# Patient Record
Sex: MALE | Race: BLACK OR AFRICAN AMERICAN | NOT HISPANIC OR LATINO | URBAN - METROPOLITAN AREA
[De-identification: names, ages, dates, MRNs, and addresses within clinical notes are randomized per-mention and may not be internally consistent; named-entity substitution may affect disease eponyms.]

---

## 2021-08-03 ENCOUNTER — INPATIENT (INPATIENT)
Facility: HOSPITAL | Age: 71
LOS: 6 days | Discharge: NURSING, REHAB OR LONG-TERM CARE | End: 2021-08-10
Attending: GENERAL ACUTE CARE HOSPITAL | Admitting: GENERAL ACUTE CARE HOSPITAL
Payer: MEDICAID

## 2021-08-03 VITALS
OXYGEN SATURATION: 97 % | HEIGHT: 67 IN | WEIGHT: 145.06 LBS | SYSTOLIC BLOOD PRESSURE: 115 MMHG | HEART RATE: 73 BPM | TEMPERATURE: 99 F | RESPIRATION RATE: 18 BRPM | DIASTOLIC BLOOD PRESSURE: 70 MMHG

## 2021-08-03 LAB
ALBUMIN SERPL ELPH-MCNC: 3.1 G/DL — LOW (ref 3.3–5)
ALP SERPL-CCNC: 97 U/L — SIGNIFICANT CHANGE UP (ref 40–120)
ALT FLD-CCNC: 28 U/L — SIGNIFICANT CHANGE UP (ref 12–78)
ANION GAP SERPL CALC-SCNC: 3 MMOL/L — LOW (ref 5–17)
APPEARANCE UR: CLEAR — SIGNIFICANT CHANGE UP
AST SERPL-CCNC: 173 U/L — HIGH (ref 15–37)
BACTERIA # UR AUTO: ABNORMAL
BASOPHILS # BLD AUTO: 0.02 K/UL — SIGNIFICANT CHANGE UP (ref 0–0.2)
BASOPHILS NFR BLD AUTO: 0.2 % — SIGNIFICANT CHANGE UP (ref 0–2)
BILIRUB SERPL-MCNC: 1.1 MG/DL — SIGNIFICANT CHANGE UP (ref 0.2–1.2)
BILIRUB UR-MCNC: NEGATIVE — SIGNIFICANT CHANGE UP
BUN SERPL-MCNC: 14 MG/DL — SIGNIFICANT CHANGE UP (ref 7–23)
CALCIUM SERPL-MCNC: 8.8 MG/DL — SIGNIFICANT CHANGE UP (ref 8.5–10.1)
CHLORIDE SERPL-SCNC: 109 MMOL/L — HIGH (ref 96–108)
CO2 SERPL-SCNC: 30 MMOL/L — SIGNIFICANT CHANGE UP (ref 22–31)
COLOR SPEC: YELLOW — SIGNIFICANT CHANGE UP
CREAT SERPL-MCNC: 0.87 MG/DL — SIGNIFICANT CHANGE UP (ref 0.5–1.3)
DIFF PNL FLD: ABNORMAL
EOSINOPHIL # BLD AUTO: 0.16 K/UL — SIGNIFICANT CHANGE UP (ref 0–0.5)
EOSINOPHIL NFR BLD AUTO: 1.8 % — SIGNIFICANT CHANGE UP (ref 0–6)
EPI CELLS # UR: SIGNIFICANT CHANGE UP
GLUCOSE BLDC GLUCOMTR-MCNC: 148 MG/DL — HIGH (ref 70–99)
GLUCOSE SERPL-MCNC: 104 MG/DL — HIGH (ref 70–99)
GLUCOSE UR QL: NEGATIVE MG/DL — SIGNIFICANT CHANGE UP
HCT VFR BLD CALC: 36.2 % — LOW (ref 39–50)
HGB BLD-MCNC: 12.5 G/DL — LOW (ref 13–17)
IMM GRANULOCYTES NFR BLD AUTO: 0.2 % — SIGNIFICANT CHANGE UP (ref 0–1.5)
KETONES UR-MCNC: ABNORMAL
LEUKOCYTE ESTERASE UR-ACNC: ABNORMAL
LYMPHOCYTES # BLD AUTO: 2.22 K/UL — SIGNIFICANT CHANGE UP (ref 1–3.3)
LYMPHOCYTES # BLD AUTO: 25.5 % — SIGNIFICANT CHANGE UP (ref 13–44)
MCHC RBC-ENTMCNC: 32.1 PG — SIGNIFICANT CHANGE UP (ref 27–34)
MCHC RBC-ENTMCNC: 34.5 GM/DL — SIGNIFICANT CHANGE UP (ref 32–36)
MCV RBC AUTO: 93.1 FL — SIGNIFICANT CHANGE UP (ref 80–100)
MONOCYTES # BLD AUTO: 0.76 K/UL — SIGNIFICANT CHANGE UP (ref 0–0.9)
MONOCYTES NFR BLD AUTO: 8.7 % — SIGNIFICANT CHANGE UP (ref 2–14)
NEUTROPHILS # BLD AUTO: 5.54 K/UL — SIGNIFICANT CHANGE UP (ref 1.8–7.4)
NEUTROPHILS NFR BLD AUTO: 63.6 % — SIGNIFICANT CHANGE UP (ref 43–77)
NITRITE UR-MCNC: NEGATIVE — SIGNIFICANT CHANGE UP
NRBC # BLD: 0 /100 WBCS — SIGNIFICANT CHANGE UP (ref 0–0)
PH UR: 6.5 — SIGNIFICANT CHANGE UP (ref 5–8)
PLATELET # BLD AUTO: 203 K/UL — SIGNIFICANT CHANGE UP (ref 150–400)
POTASSIUM SERPL-MCNC: 3.9 MMOL/L — SIGNIFICANT CHANGE UP (ref 3.5–5.3)
POTASSIUM SERPL-SCNC: 3.9 MMOL/L — SIGNIFICANT CHANGE UP (ref 3.5–5.3)
PROT SERPL-MCNC: 6.5 GM/DL — SIGNIFICANT CHANGE UP (ref 6–8.3)
PROT UR-MCNC: 30 MG/DL
RAPID RVP RESULT: SIGNIFICANT CHANGE UP
RBC # BLD: 3.89 M/UL — LOW (ref 4.2–5.8)
RBC # FLD: 12.9 % — SIGNIFICANT CHANGE UP (ref 10.3–14.5)
RBC CASTS # UR COMP ASSIST: ABNORMAL /HPF (ref 0–4)
SARS-COV-2 RNA SPEC QL NAA+PROBE: SIGNIFICANT CHANGE UP
SODIUM SERPL-SCNC: 142 MMOL/L — SIGNIFICANT CHANGE UP (ref 135–145)
SP GR SPEC: 1.01 — SIGNIFICANT CHANGE UP (ref 1.01–1.02)
UROBILINOGEN FLD QL: 4 MG/DL
WBC # BLD: 8.72 K/UL — SIGNIFICANT CHANGE UP (ref 3.8–10.5)
WBC # FLD AUTO: 8.72 K/UL — SIGNIFICANT CHANGE UP (ref 3.8–10.5)
WBC UR QL: SIGNIFICANT CHANGE UP

## 2021-08-03 PROCEDURE — 70486 CT MAXILLOFACIAL W/O DYE: CPT | Mod: 26,MA

## 2021-08-03 PROCEDURE — 70450 CT HEAD/BRAIN W/O DYE: CPT | Mod: 26

## 2021-08-03 PROCEDURE — 71045 X-RAY EXAM CHEST 1 VIEW: CPT | Mod: 26

## 2021-08-03 PROCEDURE — 72125 CT NECK SPINE W/O DYE: CPT | Mod: 26

## 2021-08-03 PROCEDURE — 72131 CT LUMBAR SPINE W/O DYE: CPT | Mod: 26,MA

## 2021-08-03 PROCEDURE — 99285 EMERGENCY DEPT VISIT HI MDM: CPT

## 2021-08-03 PROCEDURE — 99223 1ST HOSP IP/OBS HIGH 75: CPT

## 2021-08-03 RX ORDER — OXYCODONE AND ACETAMINOPHEN 5; 325 MG/1; MG/1
1 TABLET ORAL EVERY 4 HOURS
Refills: 0 | Status: DISCONTINUED | OUTPATIENT
Start: 2021-08-03 | End: 2021-08-06

## 2021-08-03 RX ORDER — SENNA PLUS 8.6 MG/1
2 TABLET ORAL AT BEDTIME
Refills: 0 | Status: DISCONTINUED | OUTPATIENT
Start: 2021-08-03 | End: 2021-08-10

## 2021-08-03 RX ORDER — ACETAMINOPHEN 500 MG
650 TABLET ORAL EVERY 6 HOURS
Refills: 0 | Status: DISCONTINUED | OUTPATIENT
Start: 2021-08-03 | End: 2021-08-10

## 2021-08-03 RX ORDER — AMLODIPINE BESYLATE 2.5 MG/1
5 TABLET ORAL DAILY
Refills: 0 | Status: DISCONTINUED | OUTPATIENT
Start: 2021-08-03 | End: 2021-08-04

## 2021-08-03 RX ORDER — POLYETHYLENE GLYCOL 3350 17 G/17G
17 POWDER, FOR SOLUTION ORAL
Refills: 0 | Status: DISCONTINUED | OUTPATIENT
Start: 2021-08-03 | End: 2021-08-10

## 2021-08-03 RX ORDER — HEPARIN SODIUM 5000 [USP'U]/ML
5000 INJECTION INTRAVENOUS; SUBCUTANEOUS EVERY 12 HOURS
Refills: 0 | Status: DISCONTINUED | OUTPATIENT
Start: 2021-08-03 | End: 2021-08-10

## 2021-08-03 RX ORDER — CARBIDOPA AND LEVODOPA 25; 100 MG/1; MG/1
1 TABLET ORAL THREE TIMES A DAY
Refills: 0 | Status: DISCONTINUED | OUTPATIENT
Start: 2021-08-03 | End: 2021-08-10

## 2021-08-03 RX ORDER — SIMVASTATIN 20 MG/1
10 TABLET, FILM COATED ORAL AT BEDTIME
Refills: 0 | Status: DISCONTINUED | OUTPATIENT
Start: 2021-08-03 | End: 2021-08-05

## 2021-08-03 RX ORDER — LIDOCAINE 4 G/100G
1 CREAM TOPICAL DAILY
Refills: 0 | Status: DISCONTINUED | OUTPATIENT
Start: 2021-08-03 | End: 2021-08-10

## 2021-08-03 RX ADMIN — SENNA PLUS 2 TABLET(S): 8.6 TABLET ORAL at 22:49

## 2021-08-03 RX ADMIN — SIMVASTATIN 10 MILLIGRAM(S): 20 TABLET, FILM COATED ORAL at 22:49

## 2021-08-03 RX ADMIN — AMLODIPINE BESYLATE 5 MILLIGRAM(S): 2.5 TABLET ORAL at 20:27

## 2021-08-03 RX ADMIN — HEPARIN SODIUM 5000 UNIT(S): 5000 INJECTION INTRAVENOUS; SUBCUTANEOUS at 20:27

## 2021-08-03 RX ADMIN — CARBIDOPA AND LEVODOPA 1 TABLET(S): 25; 100 TABLET ORAL at 22:49

## 2021-08-03 RX ADMIN — CARBIDOPA AND LEVODOPA 1 TABLET(S): 25; 100 TABLET ORAL at 20:27

## 2021-08-03 NOTE — H&P ADULT - NSHPPHYSICALEXAM_GEN_ALL_CORE
PHYSICAL EXAMINATION:  Vital Signs Last 24 Hrs  T(C): 36.7 (03 Aug 2021 15:49), Max: 37.1 (03 Aug 2021 11:33)  T(F): 98 (03 Aug 2021 15:49), Max: 98.8 (03 Aug 2021 11:33)  HR: 89 (03 Aug 2021 15:49) (73 - 89)  BP: 160/87 (03 Aug 2021 15:49) (115/70 - 160/87)  BP(mean): --  RR: 20 (03 Aug 2021 15:49) (18 - 20)  SpO2: 98% (03 Aug 2021 15:49) (97% - 98%)  CAPILLARY BLOOD GLUCOSE      POCT Blood Glucose.: 148 mg/dL (03 Aug 2021 11:43)      GENERAL: NAD, well-groomed, well-developed  HEAD:  atraumatic, normocephalic  EYES: sclera anicteric  ENMT: mucous membranes moist  NECK: supple, No JVD  CHEST/LUNG: clear to auscultation bilaterally; no rales, rhonchi, or wheezing b/l  HEART: normal S1, S2  ABDOMEN: BS+, soft, ND, NT   EXTREMITIES:  pulses palpable; no clubbing, cyanosis, or edema b/l LEs  NEURO: awake, alert, interactive; moves all extremities  SKIN: no rashes or lesions PHYSICAL EXAMINATION:  Vital Signs Last 24 Hrs  T(C): 36.7 (03 Aug 2021 15:49), Max: 37.1 (03 Aug 2021 11:33)  T(F): 98 (03 Aug 2021 15:49), Max: 98.8 (03 Aug 2021 11:33)  HR: 89 (03 Aug 2021 15:49) (73 - 89)  BP: 160/87 (03 Aug 2021 15:49) (115/70 - 160/87)  BP(mean): --  RR: 20 (03 Aug 2021 15:49) (18 - 20)  SpO2: 98% (03 Aug 2021 15:49) (97% - 98%)  CAPILLARY BLOOD GLUCOSE      POCT Blood Glucose.: 148 mg/dL (03 Aug 2021 11:43)      GENERAL: NAD, seen in ER, comfortable, mild LBP, no fevers.   HEAD:  atraumatic, normocephalic  EYES: sclera anicteric  ENMT: mucous membranes moist  NECK: supple, No JVD  CHEST/LUNG: clear to auscultation bilaterally; no rales, rhonchi, or wheezing b/l  HEART: normal S1, S2  ABDOMEN: BS+, soft, ND, NT   EXTREMITIES:  pulses palpable; no clubbing, cyanosis, or edema b/l LEs  NEURO: awake, alert, interactive; moves all extremities  SKIN: no rashes or lesions

## 2021-08-03 NOTE — H&P ADULT - ASSESSMENT
69 yo male PMH of Parkinson's syndrome, HLD, and takes aspirin daily presents to the ED for fall at home. Pt states he was reading at home, tried to get up, he stepped off and fell. No head trauma. No headache, neck pain, or chest pain. Pt has left eye swelling. Pt states has muscle back pain and it feels like a stick, jabbing pain. Pt is fully vaccinated for COVID.     Plan: Fall, PMH of Parkinson's disease. PT eval. PRN pain meds. CT LS spine and neck no fractures. CT head no acute injuries.     Will continue Sinemet tid, add Norvasc 5 mg/day for BP control and Zocur 10 mg/day for HLD.     Likely needs inpatient rehab.

## 2021-08-03 NOTE — H&P ADULT - HISTORY OF PRESENT ILLNESS
69 yo male PMH of Parkinson's syndrome, HLD, and takes aspirin daily presents to the ED for fall at home. Pt states he was reading at home, tried to get up, he stepped off and fell. No head trauma. No headache, neck pain, or chest pain. Pt has left eye swelling. Pt states has muscle back pain and it feels like a stick, jabbing pain. Pt is fully vaccinated for COVID.

## 2021-08-03 NOTE — ED PROVIDER NOTE - EYES, MLM
Clear bilaterally, pupils equal, round and reactive to light. Clear bilaterally, pupils equal, round and reactive to light +left eye swelling

## 2021-08-03 NOTE — ED PROVIDER NOTE - OBJECTIVE STATEMENT
Pt is a 69 yo male w/PMH of Parkinson's syndrome, HLD, and takes aspirin presents to the ED for fall. Pt states he was reading at home, tried to get up, he stepped off and fell. No head trauma. No headache, neck pain, or chest pain. PMD is Dr. Boyd. Pt has left eye swelling. Pt states has muscle back pain and it feels like a stick, jabbing pain. Pt is fully vaccinated for COVID. Pt is a 69 yo male w/PMH of Parkinson's syndrome and HLD,  presents to the ED for fall. Pt states he was reading at home, tried to get up, stepped off and fell. No head trauma. No headache, neck pain, or chest pain. Pt has left eye swelling, states he has muscle back pain and it feels like a stick, jabbing pain. Pt is fully vaccinated for COVID.

## 2021-08-03 NOTE — ED ADULT NURSE NOTE - OBJECTIVE STATEMENT
Pt c/o he fell several times in and out of the home, unable to do his ADLs, wife has to assist , MSK is weaker , unable to ambulate with cane anymore, pt also admits he is no longer to sit up in chair . pt does not recall his medications and doses . pt with left eye ecchymosis with small healing abrasion to the left brow.

## 2021-08-03 NOTE — ED PROVIDER NOTE - SCRIBE NAME
PT CALLED IN REQUESTING A REFILL FOR PAzelastine-Fluticasone 137-50 MCG/ACT suspension      PT CB NUMBER: [phone]  [facility]  FAX# [phone]   Lakshmi Harrington

## 2021-08-03 NOTE — ED PROVIDER NOTE - ENMT, MLM
Airway patent, Nasal mucosa clear. Mouth with normal mucosa. Throat has no vesicles, no oropharyngeal exudates and uvula is midline. Airway patent, Nasal mucosa clear. Mouth with normal mucosa. Throat has no vesicles, no oropharyngeal exudates and uvula is midline +left eye swelling

## 2021-08-03 NOTE — H&P ADULT - NSHPLABSRESULTS_GEN_ALL_CORE
LABS:                        12.5   8.72  )-----------( 203      ( 03 Aug 2021 13:59 )             36.2     08-03    142  |  109<H>  |  14  ----------------------------<  104<H>  3.9   |  30  |  0.87    Ca    8.8      03 Aug 2021 13:59    TPro  6.5  /  Alb  3.1<L>  /  TBili  1.1  /  DBili  x   /  AST  173<H>  /  ALT  28  /  AlkPhos  97  08-03      Urinalysis Basic - ( 03 Aug 2021 16:11 )    Color: Yellow / Appearance: Clear / S.015 / pH: x  Gluc: x / Ketone: Trace  / Bili: Negative / Urobili: 4 mg/dL   Blood: x / Protein: 30 mg/dL / Nitrite: Negative   Leuk Esterase: Trace / RBC: 6-10 /HPF / WBC 3-5   Sq Epi: x / Non Sq Epi: Occasional / Bacteria: Few          RADIOLOGY & ADDITIONAL TESTS:

## 2021-08-04 LAB
CULTURE RESULTS: SIGNIFICANT CHANGE UP
SPECIMEN SOURCE: SIGNIFICANT CHANGE UP

## 2021-08-04 RX ORDER — AMLODIPINE BESYLATE 2.5 MG/1
10 TABLET ORAL DAILY
Refills: 0 | Status: DISCONTINUED | OUTPATIENT
Start: 2021-08-04 | End: 2021-08-09

## 2021-08-04 RX ADMIN — AMLODIPINE BESYLATE 5 MILLIGRAM(S): 2.5 TABLET ORAL at 05:53

## 2021-08-04 RX ADMIN — POLYETHYLENE GLYCOL 3350 17 GRAM(S): 17 POWDER, FOR SOLUTION ORAL at 05:54

## 2021-08-04 RX ADMIN — HEPARIN SODIUM 5000 UNIT(S): 5000 INJECTION INTRAVENOUS; SUBCUTANEOUS at 05:54

## 2021-08-04 RX ADMIN — POLYETHYLENE GLYCOL 3350 17 GRAM(S): 17 POWDER, FOR SOLUTION ORAL at 17:35

## 2021-08-04 RX ADMIN — CARBIDOPA AND LEVODOPA 1 TABLET(S): 25; 100 TABLET ORAL at 13:17

## 2021-08-04 RX ADMIN — HEPARIN SODIUM 5000 UNIT(S): 5000 INJECTION INTRAVENOUS; SUBCUTANEOUS at 17:35

## 2021-08-04 RX ADMIN — SIMVASTATIN 10 MILLIGRAM(S): 20 TABLET, FILM COATED ORAL at 22:10

## 2021-08-04 RX ADMIN — CARBIDOPA AND LEVODOPA 1 TABLET(S): 25; 100 TABLET ORAL at 05:54

## 2021-08-04 RX ADMIN — LIDOCAINE 1 PATCH: 4 CREAM TOPICAL at 18:52

## 2021-08-04 RX ADMIN — CARBIDOPA AND LEVODOPA 1 TABLET(S): 25; 100 TABLET ORAL at 22:10

## 2021-08-04 RX ADMIN — LIDOCAINE 1 PATCH: 4 CREAM TOPICAL at 12:27

## 2021-08-04 RX ADMIN — SENNA PLUS 2 TABLET(S): 8.6 TABLET ORAL at 22:10

## 2021-08-04 NOTE — PROGRESS NOTE ADULT - SUBJECTIVE AND OBJECTIVE BOX
INTERVAL HPI/OVERNIGHT EVENTS:  Pt seen and examined at bedside.     Allergies/Intolerance: No Known Allergies      MEDICATIONS  (STANDING):  amLODIPine   Tablet 5 milliGRAM(s) Oral daily  carbidopa/levodopa  25/100 1 Tablet(s) Oral three times a day  heparin   Injectable 5000 Unit(s) SubCutaneous every 12 hours  lidocaine   4% Patch 1 Patch Transdermal daily  polyethylene glycol 3350 17 Gram(s) Oral two times a day  senna 2 Tablet(s) Oral at bedtime  simvastatin 10 milliGRAM(s) Oral at bedtime    MEDICATIONS  (PRN):  acetaminophen   Tablet .. 650 milliGRAM(s) Oral every 6 hours PRN Temp greater or equal to 38.5C (101.3F), Mild Pain (1 - 3)  oxycodone    5 mG/acetaminophen 325 mG 1 Tablet(s) Oral every 4 hours PRN Mild Pain (1 - 3)        ROS: all systems reviewed and wnl      PHYSICAL EXAMINATION:  Vital Signs Last 24 Hrs  T(C): 36.1 (04 Aug 2021 11:24), Max: 36.8 (03 Aug 2021 21:33)  T(F): 97 (04 Aug 2021 11:24), Max: 98.3 (03 Aug 2021 21:33)  HR: 103 (04 Aug 2021 11:24) (79 - 103)  BP: 151/84 (04 Aug 2021 11:24) (134/81 - 177/100)  BP(mean): 126 (03 Aug 2021 19:54) (126 - 126)  RR: 18 (04 Aug 2021 11:24) (16 - 20)  SpO2: 98% (04 Aug 2021 11:24) (97% - 98%)  CAPILLARY BLOOD GLUCOSE            GENERAL:   NECK: supple, No JVD  CHEST/LUNG: clear to auscultation bilaterally; no rales, rhonchi, or wheezing b/l  HEART: normal S1, S2  ABDOMEN: BS+, soft, ND, NT   EXTREMITIES:  pulses palpable; no clubbing, cyanosis, or edema b/l LEs  SKIN: no rashes or lesions      LABS:                        12.5   8.72  )-----------( 203      ( 03 Aug 2021 13:59 )             36.2     08-03    142  |  109<H>  |  14  ----------------------------<  104<H>  3.9   |  30  |  0.87    Ca    8.8      03 Aug 2021 13:59    TPro  6.5  /  Alb  3.1<L>  /  TBili  1.1  /  DBili  x   /  AST  173<H>  /  ALT  28  /  AlkPhos  97  08-03      Urinalysis Basic - ( 03 Aug 2021 16:11 )    Color: Yellow / Appearance: Clear / S.015 / pH: x  Gluc: x / Ketone: Trace  / Bili: Negative / Urobili: 4 mg/dL   Blood: x / Protein: 30 mg/dL / Nitrite: Negative   Leuk Esterase: Trace / RBC: 6-10 /HPF / WBC 3-5   Sq Epi: x / Non Sq Epi: Occasional / Bacteria: Few             INTERVAL HPI/OVERNIGHT EVENTS:  Pt seen and examined at bedside.     Allergies/Intolerance: No Known Allergies      MEDICATIONS  (STANDING):  amLODIPine   Tablet 5 milliGRAM(s) Oral daily  carbidopa/levodopa  25/100 1 Tablet(s) Oral three times a day  heparin   Injectable 5000 Unit(s) SubCutaneous every 12 hours  lidocaine   4% Patch 1 Patch Transdermal daily  polyethylene glycol 3350 17 Gram(s) Oral two times a day  senna 2 Tablet(s) Oral at bedtime  simvastatin 10 milliGRAM(s) Oral at bedtime    MEDICATIONS  (PRN):  acetaminophen   Tablet .. 650 milliGRAM(s) Oral every 6 hours PRN Temp greater or equal to 38.5C (101.3F), Mild Pain (1 - 3)  oxycodone    5 mG/acetaminophen 325 mG 1 Tablet(s) Oral every 4 hours PRN Mild Pain (1 - 3)        ROS: all systems reviewed and wnl      PHYSICAL EXAMINATION:  Vital Signs Last 24 Hrs  T(C): 36.1 (04 Aug 2021 11:24), Max: 36.8 (03 Aug 2021 21:33)  T(F): 97 (04 Aug 2021 11:24), Max: 98.3 (03 Aug 2021 21:33)  HR: 103 (04 Aug 2021 11:24) (79 - 103)  BP: 151/84 (04 Aug 2021 11:24) (134/81 - 177/100)  BP(mean): 126 (03 Aug 2021 19:54) (126 - 126)  RR: 18 (04 Aug 2021 11:24) (16 - 20)  SpO2: 98% (04 Aug 2021 11:24) (97% - 98%)  CAPILLARY BLOOD GLUCOSE            GENERAL: stable, comfortable on RA, daughter at bedside.   NECK: supple, No JVD  CHEST/LUNG: clear to auscultation bilaterally; no rales, rhonchi, or wheezing b/l  HEART: normal S1, S2  ABDOMEN: BS+, soft, ND, NT   EXTREMITIES:  pulses palpable; no clubbing, cyanosis, or edema b/l LEs  SKIN: no rashes or lesions      LABS:                        12.5   8.72  )-----------( 203      ( 03 Aug 2021 13:59 )             36.2     08-03    142  |  109<H>  |  14  ----------------------------<  104<H>  3.9   |  30  |  0.87    Ca    8.8      03 Aug 2021 13:59    TPro  6.5  /  Alb  3.1<L>  /  TBili  1.1  /  DBili  x   /  AST  173<H>  /  ALT  28  /  AlkPhos  97  08-03      Urinalysis Basic - ( 03 Aug 2021 16:11 )    Color: Yellow / Appearance: Clear / S.015 / pH: x  Gluc: x / Ketone: Trace  / Bili: Negative / Urobili: 4 mg/dL   Blood: x / Protein: 30 mg/dL / Nitrite: Negative   Leuk Esterase: Trace / RBC: 6-10 /HPF / WBC 3-5   Sq Epi: x / Non Sq Epi: Occasional / Bacteria: Few

## 2021-08-04 NOTE — PROGRESS NOTE ADULT - ASSESSMENT
71 yo male PMH of Parkinson's syndrome, HLD, and takes aspirin daily presents to the ED for fall at home. Pt states he was reading at home, tried to get up, he stepped off and fell. No head trauma. No headache, neck pain, or chest pain. Pt has left eye swelling. Pt states has muscle back pain and it feels like a stick, jabbing pain. Pt is fully vaccinated for COVID.     Plan: Fall, PMH of Parkinson's disease. PT eval. PRN pain meds. CT LS spine and neck no fractures. CT head no acute injuries.     Will continue Sinemet tid, add Norvasc 5 mg/day for BP control and Zocur 10 mg/day for HLD.     Likely needs inpatient rehab.  71 yo male PMH of Parkinson's syndrome, HLD, and takes aspirin daily presents to the ED for fall at home. Pt states he was reading at home, tried to get up, he stepped off and fell. No head trauma. No headache, neck pain, or chest pain. Pt has left eye swelling. Pt states has muscle back pain and it feels like a stick, jabbing pain. Pt is fully vaccinated for COVID.     Plan: Fall, PMH of Parkinson's disease. PT eval. PRN pain meds. CT LS spine and neck no fractures. CT head no acute injuries.     Will continue Sinemet tid, increase Norvasc 10 mg/day for BP control and Zocur 10 mg/day for HLD.     Likely needs inpatient rehab Thursday.

## 2021-08-05 PROCEDURE — 99233 SBSQ HOSP IP/OBS HIGH 50: CPT

## 2021-08-05 RX ADMIN — Medication 1 MILLIGRAM(S): at 14:50

## 2021-08-05 RX ADMIN — SENNA PLUS 2 TABLET(S): 8.6 TABLET ORAL at 21:51

## 2021-08-05 RX ADMIN — HEPARIN SODIUM 5000 UNIT(S): 5000 INJECTION INTRAVENOUS; SUBCUTANEOUS at 17:02

## 2021-08-05 RX ADMIN — CARBIDOPA AND LEVODOPA 1 TABLET(S): 25; 100 TABLET ORAL at 21:51

## 2021-08-05 RX ADMIN — LIDOCAINE 1 PATCH: 4 CREAM TOPICAL at 18:00

## 2021-08-05 RX ADMIN — LIDOCAINE 1 PATCH: 4 CREAM TOPICAL at 13:03

## 2021-08-05 RX ADMIN — POLYETHYLENE GLYCOL 3350 17 GRAM(S): 17 POWDER, FOR SOLUTION ORAL at 17:02

## 2021-08-05 RX ADMIN — CARBIDOPA AND LEVODOPA 1 TABLET(S): 25; 100 TABLET ORAL at 13:36

## 2021-08-05 RX ADMIN — LIDOCAINE 1 PATCH: 4 CREAM TOPICAL at 04:04

## 2021-08-05 NOTE — PROGRESS NOTE ADULT - ASSESSMENT
71 yo male PMH of Parkinson's syndrome, HLD, and takes aspirin daily presents to the ED for fall at home. Pt states he was reading at home, tried to get up, he stepped off and fell. No head trauma. No headache, neck pain, or chest pain. Pt has left eye swelling. Pt states has muscle back pain and it feels like a stick, jabbing pain. Pt is fully vaccinated for COVID.     Plan: Fall, PMH of Parkinson's disease. PT eval. PRN pain meds. CT LS spine and neck no fractures. CT head no acute injuries.     Will continue Sinemet tid, my colleague increased Norvasc 10 mg/day for BP control , monitor bp  HLD with elevated lfts hold statin     dispo await PT eval case d/w sw/cm 69 yo male PMH of Parkinson's syndrome, HLD, and takes aspirin daily presents to the ED for fall at home. Pt states he was reading at home, tried to get up, he stepped off and fell. No head trauma. No headache, neck pain, or chest pain. Pt has left eye swelling. Pt states has muscle back pain and it feels like a stick, jabbing pain. Pt is fully vaccinated for COVID.     Plan: Fall, PMH of Parkinson's disease. PT eval. PRN pain meds. CT LS spine and neck no fractures. CT head no acute injuries.   but CT lumbar has spinal stenosis l5-s1. will get ortho evaluation   consider MRI spine but has fixation hardware from surgery 20 years ago in b/l maxillary sinuses    Will continue Sinemet tid, my colleague increased Norvasc 10 mg/day for BP control , monitor bp  HLD with elevated lfts hold statin     dispo await PT eval case d/w sw/cm

## 2021-08-05 NOTE — PHYSICAL THERAPY INITIAL EVALUATION ADULT - GAIT PATTERN USED, PT EVAL
assist needed with RW navigation/abruptly freezes/delays in motor processing and sequencing noted/swing-to gait

## 2021-08-05 NOTE — PHYSICAL THERAPY INITIAL EVALUATION ADULT - TRANSFER TRAINING, PT EVAL
Yes
Independent in  transfer ability bed to chair and vice versa using appropriate assistive device  and prevent falls.

## 2021-08-05 NOTE — PROGRESS NOTE ADULT - SUBJECTIVE AND OBJECTIVE BOX
INTERVAL HPI/OVERNIGHT EVENTS:  Pt seen and examined at bedside.     Allergies/Intolerance: No Known Allergies      MEDICATIONS  (STANDING):  amLODIPine   Tablet 10 milliGRAM(s) Oral daily  carbidopa/levodopa  25/100 1 Tablet(s) Oral three times a day  heparin   Injectable 5000 Unit(s) SubCutaneous every 12 hours  lidocaine   4% Patch 1 Patch Transdermal daily  polyethylene glycol 3350 17 Gram(s) Oral two times a day  senna 2 Tablet(s) Oral at bedtime  simvastatin 10 milliGRAM(s) Oral at bedtime    MEDICATIONS  (PRN):  acetaminophen   Tablet .. 650 milliGRAM(s) Oral every 6 hours PRN Temp greater or equal to 38.5C (101.3F), Mild Pain (1 - 3)  oxycodone    5 mG/acetaminophen 325 mG 1 Tablet(s) Oral every 4 hours PRN Mild Pain (1 - 3)        ROS: all systems reviewed and wnl    Vital Signs Last 24 Hrs  T(C): 36.3 (05 Aug 2021 05:32), Max: 36.8 (04 Aug 2021 23:35)  T(F): 97.3 (05 Aug 2021 05:32), Max: 98.3 (04 Aug 2021 23:35)  HR: 77 (05 Aug 2021 05:32) (65 - 77)  BP: 131/77 (05 Aug 2021 05:32) (92/59 - 131/77)  BP(mean): --  RR: 18 (05 Aug 2021 05:32) (18 - 18)  SpO2: 98% (05 Aug 2021 05:32) (98% - 99%)    GENERAL: stable, comfortable on RA, daughter at bedside.   NECK: supple, No JVD  CHEST/LUNG: clear to auscultation bilaterally; no rales, rhonchi, or wheezing b/l  HEART: normal S1, S2  ABDOMEN: BS+, soft, ND, NT   EXTREMITIES:  pulses palpable; no clubbing, cyanosis, or edema b/l LEs  SKIN: no rashes or lesions      LABS:                                   12.5   8.72  )-----------( 203      ( 03 Aug 2021 13:59 )             36.2   08-03    142  |  109<H>  |  14  ----------------------------<  104<H>  3.9   |  30  |  0.87    Ca    8.8      03 Aug 2021 13:59    TPro  6.5  /  Alb  3.1<L>  /  TBili  1.1  /  DBili  x   /  AST  173<H>  /  ALT  28  /  AlkPhos  97  08-03        Urinalysis Basic - ( 03 Aug 2021 16:11 )    Color: Yellow / Appearance: Clear / S.015 / pH: x  Gluc: x / Ketone: Trace  / Bili: Negative / Urobili: 4 mg/dL   Blood: x / Protein: 30 mg/dL / Nitrite: Negative   Leuk Esterase: Trace / RBC: 6-10 /HPF / WBC 3-5   Sq Epi: x / Non Sq Epi: Occasional / Bacteria: Few             INTERVAL HPI/OVERNIGHT EVENTS:  Pt seen and examined at bedside.     Allergies/Intolerance: No Known Allergies      MEDICATIONS  (STANDING):  amLODIPine   Tablet 10 milliGRAM(s) Oral daily  carbidopa/levodopa  25/100 1 Tablet(s) Oral three times a day  heparin   Injectable 5000 Unit(s) SubCutaneous every 12 hours  lidocaine   4% Patch 1 Patch Transdermal daily  polyethylene glycol 3350 17 Gram(s) Oral two times a day  senna 2 Tablet(s) Oral at bedtime  simvastatin 10 milliGRAM(s) Oral at bedtime    MEDICATIONS  (PRN):  acetaminophen   Tablet .. 650 milliGRAM(s) Oral every 6 hours PRN Temp greater or equal to 38.5C (101.3F), Mild Pain (1 - 3)  oxycodone    5 mG/acetaminophen 325 mG 1 Tablet(s) Oral every 4 hours PRN Mild Pain (1 - 3)        ROS: all systems reviewed and wnl    Vital Signs Last 24 Hrs  T(C): 36.3 (05 Aug 2021 05:32), Max: 36.8 (04 Aug 2021 23:35)  T(F): 97.3 (05 Aug 2021 05:32), Max: 98.3 (04 Aug 2021 23:35)  HR: 77 (05 Aug 2021 05:32) (65 - 77)  BP: 131/77 (05 Aug 2021 05:32) (92/59 - 131/77)  BP(mean): --  RR: 18 (05 Aug 2021 05:32) (18 - 18)  SpO2: 98% (05 Aug 2021 05:32) (98% - 99%)    GENERAL: stable, comfortable on RA, wife  at bedside.   NECK: supple, No JVD  CHEST/LUNG: clear to auscultation bilaterally; no rales, rhonchi, or wheezing b/l  HEART: normal S1, S2  ABDOMEN: BS+, soft, ND, NT   EXTREMITIES:  pulses palpable; no clubbing, cyanosis, or edema b/l LEs  SKIN: no rashes or lesions   MS: pain with palpation of right paraspinal along lower lumbar and midline , symmetric weakness      LABS:                                   12.5   8.72  )-----------( 203      ( 03 Aug 2021 13:59 )             36.2   08-03    142  |  109<H>  |  14  ----------------------------<  104<H>  3.9   |  30  |  0.87    Ca    8.8      03 Aug 2021 13:59    TPro  6.5  /  Alb  3.1<L>  /  TBili  1.1  /  DBili  x   /  AST  173<H>  /  ALT  28  /  AlkPhos  97  08-03        Urinalysis Basic - ( 03 Aug 2021 16:11 )    Color: Yellow / Appearance: Clear / S.015 / pH: x  Gluc: x / Ketone: Trace  / Bili: Negative / Urobili: 4 mg/dL   Blood: x / Protein: 30 mg/dL / Nitrite: Negative   Leuk Esterase: Trace / RBC: 6-10 /HPF / WBC 3-5   Sq Epi: x / Non Sq Epi: Occasional / Bacteria: Few      < from: CT Lumbar Spine No Cont (21 @ 18:26) >  IMPRESSION:    L5-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    MRI may provide helpful additional evaluation, if clinically indicated.      < end of copied text >  < from: CT Maxillofacial No Cont (21 @ 17:08) >  IMPRESSION:    CT HEAD:  No intracranial bleed or depressed skull fracture.      CT FACIAL BONES: No acute fracture or acute dislocation.    Chronic sinus disease/sinusitis.      CT CERVICAL SPINE: No acute fracture or acute subluxation.      < end of copied text >         INTERVAL HPI/OVERNIGHT EVENTS:  Pt seen and examined at bedside.     Allergies/Intolerance: No Known Allergies      MEDICATIONS  (STANDING):  amLODIPine   Tablet 10 milliGRAM(s) Oral daily  carbidopa/levodopa  25/100 1 Tablet(s) Oral three times a day  heparin   Injectable 5000 Unit(s) SubCutaneous every 12 hours  lidocaine   4% Patch 1 Patch Transdermal daily  polyethylene glycol 3350 17 Gram(s) Oral two times a day  senna 2 Tablet(s) Oral at bedtime  simvastatin 10 milliGRAM(s) Oral at bedtime    MEDICATIONS  (PRN):  acetaminophen   Tablet .. 650 milliGRAM(s) Oral every 6 hours PRN Temp greater or equal to 38.5C (101.3F), Mild Pain (1 - 3)  oxycodone    5 mG/acetaminophen 325 mG 1 Tablet(s) Oral every 4 hours PRN Mild Pain (1 - 3)        ROS: all systems reviewed and wnl    Vital Signs Last 24 Hrs  T(C): 36.3 (05 Aug 2021 05:32), Max: 36.8 (04 Aug 2021 23:35)  T(F): 97.3 (05 Aug 2021 05:32), Max: 98.3 (04 Aug 2021 23:35)  HR: 77 (05 Aug 2021 05:32) (65 - 77)  BP: 131/77 (05 Aug 2021 05:32) (92/59 - 131/77)  BP(mean): --  RR: 18 (05 Aug 2021 05:32) (18 - 18)  SpO2: 98% (05 Aug 2021 05:32) (98% - 99%)    GENERAL: stable, comfortable on RA, wife  at bedside.   NECK: supple, No JVD  CHEST/LUNG: clear to auscultation bilaterally; no rales, rhonchi, or wheezing b/l  HEART: normal S1, S2  ABDOMEN: BS+, soft, ND, NT   EXTREMITIES:  pulses palpable; no clubbing, cyanosis, or edema b/l LEs  SKIN: no rashes or lesions   MS: pain with palpation of right paraspinal along lower lumbar and midline , symmetric weakness   neuro alert and confused      LABS:                                   12.5   8.72  )-----------( 203      ( 03 Aug 2021 13:59 )             36.2   08-03    142  |  109<H>  |  14  ----------------------------<  104<H>  3.9   |  30  |  0.87    Ca    8.8      03 Aug 2021 13:59    TPro  6.5  /  Alb  3.1<L>  /  TBili  1.1  /  DBili  x   /  AST  173<H>  /  ALT  28  /  AlkPhos  97  08-03        Urinalysis Basic - ( 03 Aug 2021 16:11 )    Color: Yellow / Appearance: Clear / S.015 / pH: x  Gluc: x / Ketone: Trace  / Bili: Negative / Urobili: 4 mg/dL   Blood: x / Protein: 30 mg/dL / Nitrite: Negative   Leuk Esterase: Trace / RBC: 6-10 /HPF / WBC 3-5   Sq Epi: x / Non Sq Epi: Occasional / Bacteria: Few      < from: CT Lumbar Spine No Cont (21 @ 18:26) >  IMPRESSION:    L5-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    MRI may provide helpful additional evaluation, if clinically indicated.      < end of copied text >  < from: CT Maxillofacial No Cont (21 @ 17:08) >  IMPRESSION:    CT HEAD:  No intracranial bleed or depressed skull fracture.      CT FACIAL BONES: No acute fracture or acute dislocation.    Chronic sinus disease/sinusitis.      CT CERVICAL SPINE: No acute fracture or acute subluxation.      < end of copied text >

## 2021-08-05 NOTE — PHYSICAL THERAPY INITIAL EVALUATION ADULT - GAIT TRAINING, PT EVAL
Independent in ambulation with use of RW device up to 100 feet observing proper gait pattern, posture and use of walking device safely.

## 2021-08-05 NOTE — CONSULT NOTE ADULT - SUBJECTIVE AND OBJECTIVE BOX
70y M presenting with a fall. Ortho was consulted to rule out spine Fx. Patient history, review of systems, and physical exam was significantly limited due to patient receiving Ativan earlier in the day. Nurse stated that Patient was agitated in the afternoon, kicked her, and then tried to walk out of his unit. She states that when security was called to help bring him back to bed, patient appeared confused stated that he didn't sleep with men. The nurse states that patient was confuse at baseline. Patient was resting comfortably when examined at bedside. Patient could not answer question intelligibly. Patient could voice his name, but could not identify where he was, what his birthday was, or what his age was.    PHYSICAL EXAM  GEN: NAD, AOx1    SPINE:  Skin intact, no incisions noted  NTTP over the bony prominences of the cervical, thoracic, lumbar, sacral spine  - Paraspinal TTP of the cervical, thoracic, lumbar, sacral spine  No bony step-offs   Grossly moving all extremities  + Radial Pulse  + DP Pulses  Grimacing with passive extension of the lower extremities, but Patient unable to localize source.        MOTOR EXAM:                          Elbow Flex (C5)     Wrist Ext (C6)     Elbow Ext (C7)      Finger Flex (C8), Finger Abduction (T1) moved spontaneous but unable to follow commands  RIGHT                 5/5                         5/5                         5/5                                                          LEFT                    5/5                         5/5                         5/5                                                                                  Hip Flex (L2)      Knee Ext (L3)      Ank Dorsiflex (L4), Hallux Ext (L5), and Ank PlantarFlex (S1) moved spontaneous but unable to follow commands  RIGHT               5/5                      5/5                                                                                 LEFT                  5/5                      5/5                                                                                  SENSORY EXAM:                        C5      C6      C7      C8       T1          RIGHT          +         +        +         +         +          (+= patient voiced sensation present, unable to determine if impaired)  LEFT             +         +        +         +         +              Lower extremity:  Patient responding to painful stimuli bilaterally, more on the right side than the left. Unable to communicate sensation in LE.      Negative Ruth's sign bilaterally  Negative Babinski bilaterally   Negative Myoclonus bilaterally     70y M presenting with a fall. Ortho was consulted due to lower extremity weakness. Patient history, review of systems, and physical exam is significantly limited due to patient receiving Ativan earlier. Nurse stated that Patient was agitated in the afternoon, kicked her, and then tried to walk out of his unit. She states that when security was called to help bring him back to bed, patient appeared confused stated that he "does not sleep with men". The nurse states that patient is also confused at baseline. Patient was resting comfortably when examined at bedside. Patient could not answer questions intelligibly. Patient could voice his name, but could not identify where he is, what his birthday is, or what his age is.    PHYSICAL EXAM  GEN: NAD, AOx1    SPINE:  Skin intact, no incisions or lesions noted  No apparent TTP over the bony prominences of the cervical, thoracic, lumbar, sacral spine  No apparent Paraspinal TTP of the cervical, thoracic, lumbar, sacral spine  No bony step-offs   Grossly moving all extremities  + Radial Pulse  + DP Pulses  Grimacing with passive extension of the lower extremities, but patient unable to localize source.      MOTOR EXAM:                          Elbow Flex (C5)     Wrist Ext (C6)     Elbow Ext (C7)      Finger Flex (C8)    Finger Abduction (T1)  RIGHT                 x/5                         x/5                         x/5                          x/5                              x/5  LEFT                    x/5                         x/5                         x/5                          x/5                              x/5                           Hip Flex (L2)      Knee Ext (L3)      Ank Dorsiflex (L4)     Hallux Ext (L5)     Ank PlantarFlex (S1)  RIGHT               x/5                      x/5                          x/5                            x/5                           x/5  LEFT                  x/5                      x/5                          x/5                            x/5                           x/5      SENSORY EXAM:                        C5      C6      C7      C8       T1          RIGHT          x         x        x         x         x          (0=absent, 1=impaired, 2=normal, NT=not testable)  LEFT             x        x       x        x        x          (0=absent, 1=impaired, 2=normal, NT=not testable)                        L2        L3       L4      L5       S1          RIGHT        x          x          x        x        x           (0=absent, 1=impaired, 2=normal, NT=not testable)  LEFT           x          x         x       x        x           (0=absent, 1=impaired, 2=normal, NT=not testable)    Negative Ruth's sign bilaterally  Negative Babinski bilaterally   Negative Myoclonus bilaterally

## 2021-08-05 NOTE — PHYSICAL THERAPY INITIAL EVALUATION ADULT - ADDITIONAL COMMENTS
Pt lives with family in a house with 4 steps to enter B railing. Pt was independent with ambulation and used cane as needed. Assist required with ADLS. Family provides assist as needed.

## 2021-08-05 NOTE — CONSULT NOTE ADULT - ASSESSMENT
70y M presenting with rule out of spinal fx.    -As noted above, exam was limited to patient's somnolence   -Will reassess patient again in the AM when patient may be more alert  -Reviewed imaging and no concern for acute fractures requiring urgent surgical fixation  -If follow up exam is concerning, will order MRI to evaluate further  -Discussed with attending, they are aware and agree with the above plan 70y M presenting with lower extremity weakness    -As noted above, exam was limited to patient's somnolence   -Will reassess patient again in the AM when patient may be more alert  -Reviewed CT scan, +degenerative changes at L5-S1, no fractures or other bony pathology noted, no concern requiring urgent surgical fixation  -If follow up exam is concerning, will order MRI to evaluate further  -Discussed with attending, they are aware and agree with the above plan

## 2021-08-06 LAB
ANION GAP SERPL CALC-SCNC: 4 MMOL/L — LOW (ref 5–17)
BUN SERPL-MCNC: 11 MG/DL — SIGNIFICANT CHANGE UP (ref 7–23)
CALCIUM SERPL-MCNC: 8.7 MG/DL — SIGNIFICANT CHANGE UP (ref 8.5–10.1)
CHLORIDE SERPL-SCNC: 108 MMOL/L — SIGNIFICANT CHANGE UP (ref 96–108)
CO2 SERPL-SCNC: 30 MMOL/L — SIGNIFICANT CHANGE UP (ref 22–31)
COVID-19 SPIKE DOMAIN AB INTERP: POSITIVE
COVID-19 SPIKE DOMAIN ANTIBODY RESULT: 199 U/ML — HIGH
CREAT SERPL-MCNC: 0.77 MG/DL — SIGNIFICANT CHANGE UP (ref 0.5–1.3)
GLUCOSE SERPL-MCNC: 109 MG/DL — HIGH (ref 70–99)
HCT VFR BLD CALC: 38.7 % — LOW (ref 39–50)
HCV AB S/CO SERPL IA: 0.1 S/CO — SIGNIFICANT CHANGE UP (ref 0–0.99)
HCV AB SERPL-IMP: SIGNIFICANT CHANGE UP
HGB BLD-MCNC: 13.4 G/DL — SIGNIFICANT CHANGE UP (ref 13–17)
MAGNESIUM SERPL-MCNC: 2.5 MG/DL — SIGNIFICANT CHANGE UP (ref 1.6–2.6)
MCHC RBC-ENTMCNC: 32.1 PG — SIGNIFICANT CHANGE UP (ref 27–34)
MCHC RBC-ENTMCNC: 34.6 GM/DL — SIGNIFICANT CHANGE UP (ref 32–36)
MCV RBC AUTO: 92.8 FL — SIGNIFICANT CHANGE UP (ref 80–100)
NRBC # BLD: 0 /100 WBCS — SIGNIFICANT CHANGE UP (ref 0–0)
PHOSPHATE SERPL-MCNC: 3.3 MG/DL — SIGNIFICANT CHANGE UP (ref 2.5–4.5)
PLATELET # BLD AUTO: 212 K/UL — SIGNIFICANT CHANGE UP (ref 150–400)
POTASSIUM SERPL-MCNC: 3.7 MMOL/L — SIGNIFICANT CHANGE UP (ref 3.5–5.3)
POTASSIUM SERPL-SCNC: 3.7 MMOL/L — SIGNIFICANT CHANGE UP (ref 3.5–5.3)
RBC # BLD: 4.17 M/UL — LOW (ref 4.2–5.8)
RBC # FLD: 12.8 % — SIGNIFICANT CHANGE UP (ref 10.3–14.5)
SARS-COV-2 IGG+IGM SERPL QL IA: 199 U/ML — HIGH
SARS-COV-2 IGG+IGM SERPL QL IA: POSITIVE
SODIUM SERPL-SCNC: 142 MMOL/L — SIGNIFICANT CHANGE UP (ref 135–145)
WBC # BLD: 6.54 K/UL — SIGNIFICANT CHANGE UP (ref 3.8–10.5)
WBC # FLD AUTO: 6.54 K/UL — SIGNIFICANT CHANGE UP (ref 3.8–10.5)

## 2021-08-06 PROCEDURE — 99232 SBSQ HOSP IP/OBS MODERATE 35: CPT

## 2021-08-06 PROCEDURE — 90792 PSYCH DIAG EVAL W/MED SRVCS: CPT

## 2021-08-06 PROCEDURE — 72148 MRI LUMBAR SPINE W/O DYE: CPT | Mod: 26

## 2021-08-06 PROCEDURE — 93010 ELECTROCARDIOGRAM REPORT: CPT

## 2021-08-06 PROCEDURE — 72141 MRI NECK SPINE W/O DYE: CPT | Mod: 26

## 2021-08-06 PROCEDURE — 72146 MRI CHEST SPINE W/O DYE: CPT | Mod: 26

## 2021-08-06 RX ORDER — QUETIAPINE FUMARATE 200 MG/1
25 TABLET, FILM COATED ORAL AT BEDTIME
Refills: 0 | Status: DISCONTINUED | OUTPATIENT
Start: 2021-08-06 | End: 2021-08-10

## 2021-08-06 RX ORDER — HALOPERIDOL DECANOATE 100 MG/ML
2 INJECTION INTRAMUSCULAR EVERY 6 HOURS
Refills: 0 | Status: DISCONTINUED | OUTPATIENT
Start: 2021-08-06 | End: 2021-08-06

## 2021-08-06 RX ORDER — SODIUM CHLORIDE 9 MG/ML
1000 INJECTION, SOLUTION INTRAVENOUS
Refills: 0 | Status: DISCONTINUED | OUTPATIENT
Start: 2021-08-06 | End: 2021-08-10

## 2021-08-06 RX ADMIN — POLYETHYLENE GLYCOL 3350 17 GRAM(S): 17 POWDER, FOR SOLUTION ORAL at 05:14

## 2021-08-06 RX ADMIN — LIDOCAINE 1 PATCH: 4 CREAM TOPICAL at 18:38

## 2021-08-06 RX ADMIN — HEPARIN SODIUM 5000 UNIT(S): 5000 INJECTION INTRAVENOUS; SUBCUTANEOUS at 17:16

## 2021-08-06 RX ADMIN — POLYETHYLENE GLYCOL 3350 17 GRAM(S): 17 POWDER, FOR SOLUTION ORAL at 17:16

## 2021-08-06 RX ADMIN — HEPARIN SODIUM 5000 UNIT(S): 5000 INJECTION INTRAVENOUS; SUBCUTANEOUS at 05:14

## 2021-08-06 RX ADMIN — QUETIAPINE FUMARATE 25 MILLIGRAM(S): 200 TABLET, FILM COATED ORAL at 22:06

## 2021-08-06 RX ADMIN — OXYCODONE AND ACETAMINOPHEN 1 TABLET(S): 5; 325 TABLET ORAL at 05:16

## 2021-08-06 RX ADMIN — LIDOCAINE 1 PATCH: 4 CREAM TOPICAL at 03:53

## 2021-08-06 RX ADMIN — CARBIDOPA AND LEVODOPA 1 TABLET(S): 25; 100 TABLET ORAL at 05:15

## 2021-08-06 RX ADMIN — LIDOCAINE 1 PATCH: 4 CREAM TOPICAL at 11:42

## 2021-08-06 RX ADMIN — CARBIDOPA AND LEVODOPA 1 TABLET(S): 25; 100 TABLET ORAL at 13:46

## 2021-08-06 RX ADMIN — CARBIDOPA AND LEVODOPA 1 TABLET(S): 25; 100 TABLET ORAL at 22:06

## 2021-08-06 RX ADMIN — AMLODIPINE BESYLATE 10 MILLIGRAM(S): 2.5 TABLET ORAL at 05:15

## 2021-08-06 RX ADMIN — LIDOCAINE 1 PATCH: 4 CREAM TOPICAL at 23:48

## 2021-08-06 RX ADMIN — SENNA PLUS 2 TABLET(S): 8.6 TABLET ORAL at 22:05

## 2021-08-06 RX ADMIN — OXYCODONE AND ACETAMINOPHEN 1 TABLET(S): 5; 325 TABLET ORAL at 07:02

## 2021-08-06 NOTE — CHART NOTE - NSCHARTNOTEFT_GEN_A_CORE
A/P: 70y Male with low back pain and lower extremity weakness     -Pain control as needed  -WBAT/OOB with assistance as needed  -VTE ppx per primary team  -MRI of the C/T/L spine demonstrates degenerative changes. No significant findings that would require urgent surgical intervention at this time, including the patients physical exam findings.  -Medical management per primary team  -No acute surgical intervention at this time.  -Patient should FU with Dr Gottlieb upon discharge from the hospital, call the office to make an appointment  -Ortho to [facility]  -Discussed with attending who is aware and agrees with the plan

## 2021-08-06 NOTE — BH CONSULTATION LIAISON ASSESSMENT NOTE - RISK ASSESSMENT
Chronic risk factors: male gender, age > 65 yrs; neurodegenerative illness. Protective factors: medication and treatment compliant; no formal psychiatric history; no  suicide attempts; no self-injurious behavior; no hx of aggression/violence; no substance use; no legal issues; stable domicile; no access to guns; strong family support; access to health services. No acute risk factors identified

## 2021-08-06 NOTE — BH CONSULTATION LIAISON ASSESSMENT NOTE - SUMMARY
behavioral dysregulation, impulsivity and agitation can occur in Parkinson's Disease and also as a side effect from Sinemet:   - TREATMENT protocol:  1) antiparkinson medications should be reduced to the minimum therapeutic dose or discontinued in a sequential manner IF possible; 2) use of second-generation antipsychotics, such as clozapine, pimavanserin, or quetiapine can be tried  - avoid first-generation antipsychotics as they worsen movements  - Patient has no capacity to make his medical decisions    behavioral dysregulation, impulsivity and agitation can occur in Parkinson's Disease and also as a side effect from Sinemet. Reason for agitation clearly identifiable at this time (Patient's wife reported that yesterday Patient accused her of kicking him out of their home / abandoning him in the hospital, so when she left, he became upset and tried to go after her.   - TREATMENT protocol:  1) antiparkinson medications should be reduced to the minimum therapeutic dose or discontinued in a sequential manner IF possible; 2) use of second-generation antipsychotics, such as clozapine, pimavanserin, or quetiapine can be tried  - avoid first-generation antipsychotics as they worsen movements  - Patient has no capacity to make his medical decisions

## 2021-08-06 NOTE — PROGRESS NOTE ADULT - ASSESSMENT
A/P: 70y Male with low back pain and lower extremity weakness     -Pain control as needed  -WBAT/OOB with assistance as needed  -VTE ppx per primary team  -May recommend MRI for further imaging workup  -Medical management per primary team  -Will discuss with attending, and will advise if plan changes

## 2021-08-06 NOTE — BH CONSULTATION LIAISON ASSESSMENT NOTE - NSBHCHARTREVIEWLAB_PSY_A_CORE FT
08-06    142  |  108  |  11  ----------------------------<  109<H>  3.7   |  30  |  0.77    Ca    8.7      06 Aug 2021 11:01  Phos  3.3     08-06  Mg     2.5     08-06

## 2021-08-06 NOTE — BH CONSULTATION LIAISON ASSESSMENT NOTE - HPI (INCLUDE ILLNESS QUALITY, SEVERITY, DURATION, TIMING, CONTEXT, MODIFYING FACTORS, ASSOCIATED SIGNS AND SYMPTOMS)
69 yo  male, noncaregiver, domiciled with family, w/PMH of Parkinson's syndrome, HLD, admitted s/p fall. He was reading at home, tried to get up, he stepped off and fell. Hospital stay notable for agitation, confusion (ie. "Nurse stated that Patient was agitated in the afternoon, kicked her, and then tried to walk out of his unit. She states that when security was called to help bring him back to bed, patient appeared confused stated that he "does not sleep with men". The nurse states that patient is also confused at baseline.") CT scan, +degenerative changes at L5-S1, no fractures or other bony pathology noted, no concern requiring urgent surgical fixation as per Ortho consult. NO charted subsequent episodes of agitation.     ISTOP Reference #:[id number] no record found  CVM no record of patient  69 yo AAM, , noncaregiver, domiciled with wife in a private home, w/PMH of Parkinson's syndrome, HLD, admitted s/p fall. He was reading at home, tried to get up, he stepped off and fell. Hospital stay notable for agitation, confusion (ie. "Nurse stated that Patient was agitated in the afternoon, kicked her, and then tried to walk out of his unit. She states that when security was called to help bring him back to bed, patient appeared confused stated that he "does not sleep with men". The nurse states that patient is also confused at baseline.") CT scan, +degenerative changes at L5-S1, no fractures or other bony pathology noted, no concern requiring urgent surgical fixation as per Ortho consult. NO charted subsequent episodes of agitation.     EXAM: dozing off in bed. Awakens when nurse gives him his medication which he takes. Makes eye contact with Writer when his name is being called , looks somewhat confused, does not engage in meaningful conversation. No PMA noted.     COLLATERAL FROM WIFE AT BEDSIDE: no formal psych hx; no substance use hx; observed changes in mood, behavior after Sinemet. Wife reported that yesterady Patient accused her of kicking him out of their home / abandoning him in the hospital, so when she left, he became upset and tried to go after her.     ISTOP Reference #:[id number] no record found  CVM no record of patient

## 2021-08-06 NOTE — BH CONSULTATION LIAISON ASSESSMENT NOTE - NSBHCONSULTRECOMMENDOTHER_PSY_A_CORE FT
discontinue haldol as it is a first-generation antipsychotic that can further worsen Parkinson's symptoms  - try Ativan 2mg IVP PRN first before resorting to antipsychotic use/trial  discontinue haldol as it is a first-generation antipsychotic that can further worsen Parkinson's symptoms  - try Ativan 2mg IVP PRN first before resorting to antipsychotic use/trial   - psych cleared for discharge

## 2021-08-06 NOTE — BH CONSULTATION LIAISON ASSESSMENT NOTE - NSBHCHARTREVIEWINVESTIGATE_PSY_A_CORE FT
CT Lumbar Spine No Cont (08.03.21)  L5-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  MRI may provide helpful additional evaluation, if clinically indicated.  QTc 439

## 2021-08-06 NOTE — PROGRESS NOTE ADULT - SUBJECTIVE AND OBJECTIVE BOX
Patient seen and examined at bedside. Awake and alert this am. Oriented to person and place. Knows birthday but is not able to report appropriate age. No acute complaints at this time. Pain well controlled. Denies fevers/chills. Denies bowel/bladder changes. Denies saddle anesthesia. Denies numbness/tingling/paresthesias.       Vital Signs Last 24 Hrs  T(C): 36.4 (08-06-21 @ 05:22), Max: 36.8 (08-05-21 @ 17:26)  T(F): 97.6 (08-06-21 @ 05:22), Max: 98.2 (08-05-21 @ 17:26)  HR: 84 (08-06-21 @ 05:22) (68 - 106)  BP: 151/86 (08-06-21 @ 05:22) (118/64 - 151/86)  BP(mean): --  RR: 18 (08-06-21 @ 05:22) (18 - 20)  SpO2: 98% (08-06-21 @ 05:22) (96% - 98%)      PHYSICAL EXAM  GEN: NAD, AAOx3    SPINE:  Skin intact  No ttp to palpation throughout c/t/l/s spine  Grossly moving all extremities  + Radial Pulses  + DP Pulses  Compartments soft and compressible  No calf tenderness bilaterally      MOTOR EXAM:                          Elbow Flex (C5)     Wrist Ext (C6)     Elbow Ext (C7)      Finger Flex (C8)    Finger Abduction (T1)  RIGHT                 5/5                         5/5                         5/5                          5/5                              4/5  LEFT                    5/5                         5/5                         5/5                          5/5                              5/5                           Hip Flex (L2)      Knee Ext (L3)      Ank Dorsiflex (L4)     Hallux Ext (L5)     Ank PlantarFlex (S1)  RIGHT               5/5                      5/5                          4/5                            5/5                           5/5  LEFT                  5/5                      5/5                          5/5                            5/5                           5/5      SENSORY EXAM:                      C5      C6      C7      C8       T1          RIGHT          2         2        2         2         2          (0=absent, 1=impaired, 2=normal, NT=not testable)  LEFT             2         2        2         2         2          (0=absent, 1=impaired, 2=normal, NT=not testable)                        L2        L3       L4      L5       S1          RIGHT        2          2         2        2        2           (0=absent, 1=impaired, 2=normal, NT=not testable)  LEFT           2          2         2        2        2           (0=absent, 1=impaired, 2=normal, NT=not testable)    Negative Ruth's sign bilaterally  Negative Babinski bilaterally   Negative Myoclonus bilaterally           Patient seen and examined at bedside. Awake and alert this am. Oriented to person and place. Knows birthday but is not able to report appropriate age. No acute complaints at this time. Pain well controlled. Denies fevers/chills. Denies bowel/bladder changes. Denies saddle anesthesia. Denies numbness/tingling/paresthesias.       Vital Signs Last 24 Hrs  T(C): 36.4 (08-06-21 @ 05:22), Max: 36.8 (08-05-21 @ 17:26)  T(F): 97.6 (08-06-21 @ 05:22), Max: 98.2 (08-05-21 @ 17:26)  HR: 84 (08-06-21 @ 05:22) (68 - 106)  BP: 151/86 (08-06-21 @ 05:22) (118/64 - 151/86)  BP(mean): --  RR: 18 (08-06-21 @ 05:22) (18 - 20)  SpO2: 98% (08-06-21 @ 05:22) (96% - 98%)      PHYSICAL EXAM  GEN: NAD, AAOx3    SPINE:  Skin intact  No ttp to palpation throughout c/t/l/s spine  Grossly moving all extremities  + Radial Pulses  + DP Pulses  Compartments soft and compressible  No calf tenderness bilaterally      MOTOR EXAM:                          Elbow Flex (C5)     Wrist Ext (C6)     Elbow Ext (C7)      Finger Flex (C8)    Finger Abduction (T1)  RIGHT                 5/5                         5/5                         5/5                          5/5                              4/5  LEFT                    5/5                         5/5                         5/5                          5/5                              5/5                           Hip Flex (L2)      Knee Ext (L3)      Ank Dorsiflex (L4)     Hallux Ext (L5)     Ank PlantarFlex (S1)  RIGHT               5/5                      5/5                          4/5                            4/5                           5/5  LEFT                  5/5                      5/5                          5/5                            5/5                           5/5      SENSORY EXAM:                      C5      C6      C7      C8       T1          RIGHT          2         2        2         2         2          (0=absent, 1=impaired, 2=normal, NT=not testable)  LEFT             2         2        2         2         2          (0=absent, 1=impaired, 2=normal, NT=not testable)                        L2        L3       L4      L5       S1          RIGHT        2          2         2        2        2           (0=absent, 1=impaired, 2=normal, NT=not testable)  LEFT           2          2         2        2        2           (0=absent, 1=impaired, 2=normal, NT=not testable)    Negative Ruth's sign bilaterally  Negative Babinski bilaterally   Negative Myoclonus bilaterally           Patient seen and examined at bedside. Awake and alert this am. Oriented to person and place. Knows birthday but is not able to report appropriate age. No acute complaints at this time. Pain well controlled. Denies fevers/chills. Denies bowel/bladder changes. Denies saddle anesthesia. Denies numbness/tingling/paresthesias.       Vital Signs Last 24 Hrs  T(C): 36.4 (08-06-21 @ 05:22), Max: 36.8 (08-05-21 @ 17:26)  T(F): 97.6 (08-06-21 @ 05:22), Max: 98.2 (08-05-21 @ 17:26)  HR: 84 (08-06-21 @ 05:22) (68 - 106)  BP: 151/86 (08-06-21 @ 05:22) (118/64 - 151/86)  BP(mean): --  RR: 18 (08-06-21 @ 05:22) (18 - 20)  SpO2: 98% (08-06-21 @ 05:22) (96% - 98%)      PHYSICAL EXAM  GEN: NAD, AAOx3    SPINE:  Skin intact  No ttp to palpation throughout c/t/l/s spine  Grossly moving all extremities  + Radial Pulses  + DP Pulses  Compartments soft and compressible  No calf tenderness bilaterally      MOTOR EXAM:                          Elbow Flex (C5)     Wrist Ext (C6)     Elbow Ext (C7)      Finger Flex (C8)    Finger Abduction (T1)  RIGHT                 5/5                         5/5                         5/5                          5/5                              4/5  LEFT                    5/5                         5/5                         5/5                          5/5                              5/5                           Hip Flex (L2)      Knee Ext (L3)      Ank Dorsiflex (L4)     Hallux Ext (L5)     Ank PlantarFlex (S1)  RIGHT               5/5                      5/5                          4/5                            4/5                           5/5  LEFT                  5-/5                     5/5                          5/5                            5/5                           5/5      SENSORY EXAM:                      C5      C6      C7      C8       T1          RIGHT          2         2        2         2         2          (0=absent, 1=impaired, 2=normal, NT=not testable)  LEFT             2         2        2         2         2          (0=absent, 1=impaired, 2=normal, NT=not testable)                        L2        L3       L4      L5       S1          RIGHT        2          2         2        2        2           (0=absent, 1=impaired, 2=normal, NT=not testable)  LEFT           2          2         2        2        2           (0=absent, 1=impaired, 2=normal, NT=not testable)    Negative Ruth's sign bilaterally  Negative Babinski bilaterally   Negative Myoclonus bilaterally

## 2021-08-06 NOTE — PROGRESS NOTE ADULT - ASSESSMENT
71 yo male PMH of Parkinson's syndrome, HLD, and takes aspirin daily presents to the ED for fall at home.    CXR: clear   UA neg   RVP neg   EKG: NSR, QTc 439     Assessment and Plan:   s/p Fall    Pt with low back pain and lower extremity weakness   no e/o infection, afebrile, no leukocytosis    CT LS spine and neck no fractures. CT head no acute injuries. Chronic sinus disease/sinusitis.  Ortho following , follow MRI C/T/L spine   pain control prn , lidocaine patch     Parkinson's Dementia -- with agitated/ aggressive  behavior   Will continue Sinemet tid,   add haldol IVP prn (AVOID ATIVAN)   seroquel qhs   EKG reviewed , no QTc prolongation     HTN/HLD   continue with  Norvasc 10 mg/day for BP control , monitor bp  HLD with elevated lfts hold statin, repeat LFTs      Preventative measures   heparin SQ-dvt ppx  fall precautions   aspiration precautions     PT: ALEKSANDRA        71 yo male PMH of Parkinson's syndrome, HLD, and takes aspirin daily presents to the ED for fall at home.    CXR: clear   UA neg   RVP neg   EKG: NSR, QTc 439     Assessment and Plan:   s/p Fall    Pt with low back pain and lower extremity weakness   no e/o infection, afebrile, no leukocytosis    CT LS spine and neck no fractures. CT head no acute injuries. Chronic sinus disease/sinusitis.  Ortho following , follow MRI C/T/L spine   pain control prn , lidocaine patch     Parkinson's Dementia -- with agitated/ aggressive  behavior   Will continue Sinemet tid,   add haldol IVP prn (AVOID ATIVAN)   seroquel qhs   EKG reviewed , no QTc prolongation   Psych consulted     HTN/HLD   continue with  Norvasc 10 mg/day for BP control , monitor bp  HLD with elevated lfts hold statin, repeat LFTs      Preventative measures   heparin SQ-dvt ppx  fall precautions   aspiration precautions     PT: ALEKSANDRA

## 2021-08-06 NOTE — BH CONSULTATION LIAISON ASSESSMENT NOTE - OTHER
impaired at this time  constricted with low reactivity  unable to describe  limited  deferred at this time  adequate  did not engage verbally

## 2021-08-06 NOTE — PROGRESS NOTE ADULT - SUBJECTIVE AND OBJECTIVE BOX
69 yo male PMH of Parkinson's syndrome, HLD, and takes aspirin daily presents to the ED for fall at home. Pt states he was reading at home, tried to get up, he stepped off and fell. No head trauma. No headache, neck pain, or chest pain. Pt has left eye swelling. Pt states has muscle back pain and it feels like a stick, jabbing pain. Pt is fully vaccinated for COVID.       INTERVAL HPI/OVERNIGHT EVENTS:  Pt seen and examined at bedside.   no overnight events,.   combative and aggressive yesterday, requiring ativan IVP x 1     today more calm     Denies fever, chills, N/V, dizziness, HA, cough, CP, palpitations, SOB, abdominal pain, dysuria, diarrhea, constipation.     Allergies/Intolerance: No Known Allergies      MEDICATIONS  (STANDING):  amLODIPine   Tablet 10 milliGRAM(s) Oral daily  carbidopa/levodopa  25/100 1 Tablet(s) Oral three times a day  heparin   Injectable 5000 Unit(s) SubCutaneous every 12 hours  lidocaine   4% Patch 1 Patch Transdermal daily  polyethylene glycol 3350 17 Gram(s) Oral two times a day  senna 2 Tablet(s) Oral at bedtime  simvastatin 10 milliGRAM(s) Oral at bedtime    MEDICATIONS  (PRN):  acetaminophen   Tablet .. 650 milliGRAM(s) Oral every 6 hours PRN Temp greater or equal to 38.5C (101.3F), Mild Pain (1 - 3)  oxycodone    5 mG/acetaminophen 325 mG 1 Tablet(s) Oral every 4 hours PRN Mild Pain (1 - 3)        Vital Signs Last 24 Hrs  T(C): 36.6 (06 Aug 2021 11:23), Max: 36.8 (05 Aug 2021 17:26)  T(F): 97.8 (06 Aug 2021 11:23), Max: 98.2 (05 Aug 2021 17:26)  HR: 100 (06 Aug 2021 11:23) (70 - 106)  BP: 123/78 (06 Aug 2021 11:23) (118/64 - 151/86)  BP(mean): --  RR: 18 (06 Aug 2021 11:23) (18 - 18)  SpO2: 97% (06 Aug 2021 11:23) (96% - 98%)    GENERAL: comfortable on RA,  NAD  NECK: supple, No JVD  CHEST/LUNG: clear to auscultation bilaterally; no rales, rhonchi, or wheezing b/l  HEART: normal S1, S2  ABDOMEN: BS+, soft, ND, NT   EXTREMITIES:  pulses palpable b/l; no clubbing, cyanosis, calf tenderness or edema b/l LEs  SKIN: no rashes or lesions   MS: pain with palpation of right paraspinal along lower lumbar and midline , symmetric weakness   neuro alert and confused, moving all extremities       LABS:                                              13.4   6.54  )-----------( 212      ( 06 Aug 2021 11:01 )             38.7   08-06    142  |  108  |  11  ----------------------------<  109<H>  3.7   |  30  |  0.77    Ca    8.7      06 Aug 2021 11:01  Phos  3.3     08-06  Mg     2.5     08-06            Urinalysis Basic - ( 03 Aug 2021 16:11 )    Color: Yellow / Appearance: Clear / S.015 / pH: x  Gluc: x / Ketone: Trace  / Bili: Negative / Urobili: 4 mg/dL   Blood: x / Protein: 30 mg/dL / Nitrite: Negative   Leuk Esterase: Trace / RBC: 6-10 /HPF / WBC 3-5   Sq Epi: x / Non Sq Epi: Occasional / Bacteria: Few      < from: CT Lumbar Spine No Cont (21 @ 18:26) >  IMPRESSION:    L5-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    MRI may provide helpful additional evaluation, if clinically indicated.      < end of copied text >  < from: CT Maxillofacial No Cont (21 @ 17:08) >  IMPRESSION:    CT HEAD:  No intracranial bleed or depressed skull fracture.      CT FACIAL BONES: No acute fracture or acute dislocation.    Chronic sinus disease/sinusitis.      CT CERVICAL SPINE: No acute fracture or acute subluxation.      < end of copied text >

## 2021-08-06 NOTE — BH CONSULTATION LIAISON ASSESSMENT NOTE - NSBHCHARTREVIEWVS_PSY_A_CORE FT
Vital Signs Last 24 Hrs  T(C): 36.6 (06 Aug 2021 11:23), Max: 36.8 (05 Aug 2021 17:26)  T(F): 97.8 (06 Aug 2021 11:23), Max: 98.2 (05 Aug 2021 17:26)  HR: 100 (06 Aug 2021 11:23) (70 - 106)  BP: 123/78 (06 Aug 2021 11:23) (118/64 - 151/86)  BP(mean): --  RR: 18 (06 Aug 2021 11:23) (18 - 18)  SpO2: 97% (06 Aug 2021 11:23) (96% - 98%)

## 2021-08-06 NOTE — BH CONSULTATION LIAISON ASSESSMENT NOTE - NSSUICPROTFACT_PSY_ALL_CORE
Responsibility to children, family, or others/Identifies reasons for living/Supportive social network of family or friends/Fear of death or the actual act of killing self/Positive therapeutic relationships/Sikhism beliefs

## 2021-08-06 NOTE — BH CONSULTATION LIAISON ASSESSMENT NOTE - CURRENT MEDICATION
MEDICATIONS  (STANDING):  amLODIPine   Tablet 10 milliGRAM(s) Oral daily  carbidopa/levodopa  25/100 1 Tablet(s) Oral three times a day  heparin   Injectable 5000 Unit(s) SubCutaneous every 12 hours  lactated ringers. 1000 milliLiter(s) (85 mL/Hr) IV Continuous <Continuous>  lidocaine   4% Patch 1 Patch Transdermal daily  polyethylene glycol 3350 17 Gram(s) Oral two times a day  QUEtiapine 25 milliGRAM(s) Oral at bedtime  senna 2 Tablet(s) Oral at bedtime    MEDICATIONS  (PRN):  acetaminophen   Tablet .. 650 milliGRAM(s) Oral every 6 hours PRN Temp greater or equal to 38.5C (101.3F), Mild Pain (1 - 3)  haloperidol    Injectable 2 milliGRAM(s) IV Push every 6 hours PRN severe agitation/combative  oxycodone    5 mG/acetaminophen 325 mG 1 Tablet(s) Oral every 4 hours PRN Mild Pain (1 - 3)

## 2021-08-07 LAB — GLUCOSE BLDC GLUCOMTR-MCNC: 106 MG/DL — HIGH (ref 70–99)

## 2021-08-07 PROCEDURE — 99232 SBSQ HOSP IP/OBS MODERATE 35: CPT

## 2021-08-07 RX ADMIN — LIDOCAINE 1 PATCH: 4 CREAM TOPICAL at 11:09

## 2021-08-07 RX ADMIN — LIDOCAINE 1 PATCH: 4 CREAM TOPICAL at 21:56

## 2021-08-07 RX ADMIN — POLYETHYLENE GLYCOL 3350 17 GRAM(S): 17 POWDER, FOR SOLUTION ORAL at 05:52

## 2021-08-07 RX ADMIN — HEPARIN SODIUM 5000 UNIT(S): 5000 INJECTION INTRAVENOUS; SUBCUTANEOUS at 17:11

## 2021-08-07 RX ADMIN — QUETIAPINE FUMARATE 25 MILLIGRAM(S): 200 TABLET, FILM COATED ORAL at 21:35

## 2021-08-07 RX ADMIN — AMLODIPINE BESYLATE 10 MILLIGRAM(S): 2.5 TABLET ORAL at 05:54

## 2021-08-07 RX ADMIN — CARBIDOPA AND LEVODOPA 1 TABLET(S): 25; 100 TABLET ORAL at 14:51

## 2021-08-07 RX ADMIN — CARBIDOPA AND LEVODOPA 1 TABLET(S): 25; 100 TABLET ORAL at 21:35

## 2021-08-07 RX ADMIN — HEPARIN SODIUM 5000 UNIT(S): 5000 INJECTION INTRAVENOUS; SUBCUTANEOUS at 05:52

## 2021-08-07 RX ADMIN — SENNA PLUS 2 TABLET(S): 8.6 TABLET ORAL at 21:35

## 2021-08-07 RX ADMIN — SODIUM CHLORIDE 85 MILLILITER(S): 9 INJECTION, SOLUTION INTRAVENOUS at 05:44

## 2021-08-07 RX ADMIN — CARBIDOPA AND LEVODOPA 1 TABLET(S): 25; 100 TABLET ORAL at 05:52

## 2021-08-07 NOTE — PROGRESS NOTE ADULT - ASSESSMENT
71 yo male PMH of Parkinson's syndrome, HLD, and takes aspirin daily presents to the ED for fall at home.    CXR: clear   UA neg   RVP neg   EKG: NSR, QTc 439     Assessment and Plan:   s/p Fall    Pt with low back pain and lower extremity weakness   no e/o infection, afebrile, no leukocytosis    CT LS spine and neck no fractures. CT head no acute injuries. Chronic sinus disease/sinusitis.  Ortho following , follow MRI C/T/L spine   pain control prn , lidocaine patch     Parkinson's Dementia -- with agitated/ aggressive  behavior   Will continue Sinemet tid,   seroquel qhs   ativan prn per psych recs   EKG reviewed , no QTc prolongation   Psych consulted appreciated     HTN/HLD   continue with  Norvasc 10 mg/day for BP control , monitor bp  HLD with elevated lfts hold statin, repeat LFTs      Preventative measures   heparin SQ-dvt ppx  fall precautions   aspiration precautions     PT: ALEKSANDRA , awaiting auth

## 2021-08-07 NOTE — PROGRESS NOTE ADULT - SUBJECTIVE AND OBJECTIVE BOX
69 yo male PMH of Parkinson's syndrome, HLD, and takes aspirin daily presents to the ED for fall at home. Pt states he was reading at home, tried to get up, he stepped off and fell. No head trauma. No headache, neck pain, or chest pain. Pt has left eye swelling. Pt states has muscle back pain and it feels like a stick, jabbing pain. Pt is fully vaccinated for COVID.       INTERVAL HPI/OVERNIGHT EVENTS:  Pt seen and examined at bedside.   no overnight events,.   calm and cooperative today     Denies fever, chills, N/V, dizziness, HA, cough, CP, palpitations, SOB, abdominal pain, dysuria, diarrhea, constipation.     Allergies/Intolerance: No Known Allergies      MEDICATIONS  (STANDING):  amLODIPine   Tablet 10 milliGRAM(s) Oral daily  carbidopa/levodopa  25/100 1 Tablet(s) Oral three times a day  heparin   Injectable 5000 Unit(s) SubCutaneous every 12 hours  lidocaine   4% Patch 1 Patch Transdermal daily  polyethylene glycol 3350 17 Gram(s) Oral two times a day  senna 2 Tablet(s) Oral at bedtime  simvastatin 10 milliGRAM(s) Oral at bedtime    MEDICATIONS  (PRN):  acetaminophen   Tablet .. 650 milliGRAM(s) Oral every 6 hours PRN Temp greater or equal to 38.5C (101.3F), Mild Pain (1 - 3)  oxycodone    5 mG/acetaminophen 325 mG 1 Tablet(s) Oral every 4 hours PRN Mild Pain (1 - 3)        Vital Signs Last 24 Hrs  T(C): 36.6 (07 Aug 2021 16:16), Max: 36.6 (07 Aug 2021 00:14)  T(F): 97.9 (07 Aug 2021 16:16), Max: 97.9 (07 Aug 2021 16:16)  HR: 76 (07 Aug 2021 16:16) (76 - 100)  BP: 119/71 (07 Aug 2021 16:16) (109/69 - 157/66)  BP(mean): --  RR: 17 (07 Aug 2021 16:16) (17 - 19)  SpO2: 99% (07 Aug 2021 16:16) (97% - 99%)    GENERAL: comfortable on RA,  NAD  NECK: supple, No JVD  CHEST/LUNG: clear to auscultation bilaterally; no rales, rhonchi, or wheezing b/l  HEART: normal S1, S2  ABDOMEN: BS+, soft, ND, NT   EXTREMITIES:  pulses palpable b/l; no clubbing, cyanosis, calf tenderness or edema b/l LEs  SKIN: no rashes or lesions   MS: pain with palpation of right paraspinal along lower lumbar and midline , symmetric weakness   neuro alert and confused, moving all extremities       LABS:                                              13.4   6.54  )-----------( 212      ( 06 Aug 2021 11:01 )             38.7   08-06    142  |  108  |  11  ----------------------------<  109<H>  3.7   |  30  |  0.77    Ca    8.7      06 Aug 2021 11:01  Phos  3.3     08-  Mg     2.5     08-06                Urinalysis Basic - ( 03 Aug 2021 16:11 )    Color: Yellow / Appearance: Clear / S.015 / pH: x  Gluc: x / Ketone: Trace  / Bili: Negative / Urobili: 4 mg/dL   Blood: x / Protein: 30 mg/dL / Nitrite: Negative   Leuk Esterase: Trace / RBC: 6-10 /HPF / WBC 3-5   Sq Epi: x / Non Sq Epi: Occasional / Bacteria: Few      < from: CT Lumbar Spine No Cont (21 @ 18:26) >  IMPRESSION:    L5-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    MRI may provide helpful additional evaluation, if clinically indicated.      < end of copied text >  < from: CT Maxillofacial No Cont (21 @ 17:08) >  IMPRESSION:    CT HEAD:  No intracranial bleed or depressed skull fracture.      CT FACIAL BONES: No acute fracture or acute dislocation.    Chronic sinus disease/sinusitis.      CT CERVICAL SPINE: No acute fracture or acute subluxation.      < end of copied text >    Care discussed in detail with patient and daughter at bedside.  All questions and concerns addressed

## 2021-08-08 LAB
ALBUMIN SERPL ELPH-MCNC: 2.6 G/DL — LOW (ref 3.3–5)
ALP SERPL-CCNC: 98 U/L — SIGNIFICANT CHANGE UP (ref 40–120)
ALT FLD-CCNC: 12 U/L — SIGNIFICANT CHANGE UP (ref 12–78)
ANION GAP SERPL CALC-SCNC: 7 MMOL/L — SIGNIFICANT CHANGE UP (ref 5–17)
AST SERPL-CCNC: 29 U/L — SIGNIFICANT CHANGE UP (ref 15–37)
BILIRUB DIRECT SERPL-MCNC: 0.23 MG/DL — HIGH (ref 0.05–0.2)
BILIRUB INDIRECT FLD-MCNC: 0.9 MG/DL — SIGNIFICANT CHANGE UP (ref 0.2–1)
BILIRUB SERPL-MCNC: 1.1 MG/DL — SIGNIFICANT CHANGE UP (ref 0.2–1.2)
BUN SERPL-MCNC: 12 MG/DL — SIGNIFICANT CHANGE UP (ref 7–23)
CALCIUM SERPL-MCNC: 8.7 MG/DL — SIGNIFICANT CHANGE UP (ref 8.5–10.1)
CHLORIDE SERPL-SCNC: 106 MMOL/L — SIGNIFICANT CHANGE UP (ref 96–108)
CO2 SERPL-SCNC: 28 MMOL/L — SIGNIFICANT CHANGE UP (ref 22–31)
CREAT SERPL-MCNC: 0.92 MG/DL — SIGNIFICANT CHANGE UP (ref 0.5–1.3)
GLUCOSE SERPL-MCNC: 92 MG/DL — SIGNIFICANT CHANGE UP (ref 70–99)
HCT VFR BLD CALC: 38.9 % — LOW (ref 39–50)
HGB BLD-MCNC: 13.1 G/DL — SIGNIFICANT CHANGE UP (ref 13–17)
MAGNESIUM SERPL-MCNC: 2.3 MG/DL — SIGNIFICANT CHANGE UP (ref 1.6–2.6)
MCHC RBC-ENTMCNC: 32.3 PG — SIGNIFICANT CHANGE UP (ref 27–34)
MCHC RBC-ENTMCNC: 33.7 GM/DL — SIGNIFICANT CHANGE UP (ref 32–36)
MCV RBC AUTO: 95.8 FL — SIGNIFICANT CHANGE UP (ref 80–100)
NRBC # BLD: 0 /100 WBCS — SIGNIFICANT CHANGE UP (ref 0–0)
PHOSPHATE SERPL-MCNC: 3.5 MG/DL — SIGNIFICANT CHANGE UP (ref 2.5–4.5)
PLATELET # BLD AUTO: 229 K/UL — SIGNIFICANT CHANGE UP (ref 150–400)
POTASSIUM SERPL-MCNC: 3.8 MMOL/L — SIGNIFICANT CHANGE UP (ref 3.5–5.3)
POTASSIUM SERPL-SCNC: 3.8 MMOL/L — SIGNIFICANT CHANGE UP (ref 3.5–5.3)
PROT SERPL-MCNC: 6.3 GM/DL — SIGNIFICANT CHANGE UP (ref 6–8.3)
RBC # BLD: 4.06 M/UL — LOW (ref 4.2–5.8)
RBC # FLD: 12.9 % — SIGNIFICANT CHANGE UP (ref 10.3–14.5)
SODIUM SERPL-SCNC: 141 MMOL/L — SIGNIFICANT CHANGE UP (ref 135–145)
WBC # BLD: 6.65 K/UL — SIGNIFICANT CHANGE UP (ref 3.8–10.5)
WBC # FLD AUTO: 6.65 K/UL — SIGNIFICANT CHANGE UP (ref 3.8–10.5)

## 2021-08-08 PROCEDURE — 99232 SBSQ HOSP IP/OBS MODERATE 35: CPT

## 2021-08-08 RX ADMIN — Medication 2 MILLIGRAM(S): at 13:24

## 2021-08-08 RX ADMIN — QUETIAPINE FUMARATE 25 MILLIGRAM(S): 200 TABLET, FILM COATED ORAL at 21:43

## 2021-08-08 RX ADMIN — LIDOCAINE 1 PATCH: 4 CREAM TOPICAL at 19:00

## 2021-08-08 RX ADMIN — POLYETHYLENE GLYCOL 3350 17 GRAM(S): 17 POWDER, FOR SOLUTION ORAL at 05:54

## 2021-08-08 RX ADMIN — CARBIDOPA AND LEVODOPA 1 TABLET(S): 25; 100 TABLET ORAL at 05:54

## 2021-08-08 RX ADMIN — HEPARIN SODIUM 5000 UNIT(S): 5000 INJECTION INTRAVENOUS; SUBCUTANEOUS at 05:54

## 2021-08-08 RX ADMIN — LIDOCAINE 1 PATCH: 4 CREAM TOPICAL at 23:36

## 2021-08-08 RX ADMIN — CARBIDOPA AND LEVODOPA 1 TABLET(S): 25; 100 TABLET ORAL at 21:43

## 2021-08-08 RX ADMIN — CARBIDOPA AND LEVODOPA 1 TABLET(S): 25; 100 TABLET ORAL at 13:24

## 2021-08-08 RX ADMIN — LIDOCAINE 1 PATCH: 4 CREAM TOPICAL at 11:42

## 2021-08-08 RX ADMIN — HEPARIN SODIUM 5000 UNIT(S): 5000 INJECTION INTRAVENOUS; SUBCUTANEOUS at 18:31

## 2021-08-08 RX ADMIN — SENNA PLUS 2 TABLET(S): 8.6 TABLET ORAL at 21:43

## 2021-08-08 RX ADMIN — AMLODIPINE BESYLATE 10 MILLIGRAM(S): 2.5 TABLET ORAL at 05:54

## 2021-08-08 NOTE — PROGRESS NOTE ADULT - ASSESSMENT
69 yo male PMH of Parkinson's syndrome, HLD, and takes aspirin daily presents to the ED for fall at home.    CXR: clear   UA neg   RVP neg   EKG: NSR, QTc 439     Assessment and Plan:   s/p Fall    Pt with low back pain and lower extremity weakness   no e/o infection, afebrile, no leukocytosis    CT LS spine and neck no fractures. CT head no acute injuries. Chronic sinus disease/sinusitis.  Ortho following ,  MRI C/T/L spine reviewed by ortho, MRI of the C/T/L spine demonstrates degenerative changes. No significant findings that would require urgent surgical intervention at this time, including the patients physical exam findings.  pain control prn , lidocaine patch   will add baclofen while patient is here, stop  on discharge to minimize risk of fall     Parkinson's Dementia -- with agitated/ aggressive  behavior   Will continue Sinemet tid,   seroquel qhs   ativan prn per psych recs   EKG reviewed , no QTc prolongation   Psych consulted appreciated     HTN/HLD   continue with  Norvasc 10 mg/day for BP control , monitor bp  HLD with elevated lfts hold statin, repeat LFTs      Preventative measures   heparin SQ-dvt ppx  fall precautions   aspiration precautions     PT: ALEKSANDRA , awaiting auth

## 2021-08-08 NOTE — PROGRESS NOTE ADULT - SUBJECTIVE AND OBJECTIVE BOX
71 yo male PMH of Parkinson's syndrome, HLD, and takes aspirin daily presents to the ED for fall at home. Pt states he was reading at home, tried to get up, he stepped off and fell. No head trauma. No headache, neck pain, or chest pain. Pt has left eye swelling. Pt states has muscle back pain and it feels like a stick, jabbing pain. Pt is fully vaccinated for COVID.       INTERVAL HPI/OVERNIGHT EVENTS:  Pt seen and examined at bedside.   no overnight events,.   no complaints.     Denies fever, chills, N/V, dizziness, HA, cough, CP, palpitations, SOB, abdominal pain, dysuria, diarrhea, constipation.     Allergies/Intolerance: No Known Allergies      MEDICATIONS  (STANDING):  amLODIPine   Tablet 10 milliGRAM(s) Oral daily  carbidopa/levodopa  25/100 1 Tablet(s) Oral three times a day  heparin   Injectable 5000 Unit(s) SubCutaneous every 12 hours  lidocaine   4% Patch 1 Patch Transdermal daily  polyethylene glycol 3350 17 Gram(s) Oral two times a day  senna 2 Tablet(s) Oral at bedtime  simvastatin 10 milliGRAM(s) Oral at bedtime    MEDICATIONS  (PRN):  acetaminophen   Tablet .. 650 milliGRAM(s) Oral every 6 hours PRN Temp greater or equal to 38.5C (101.3F), Mild Pain (1 - 3)  oxycodone    5 mG/acetaminophen 325 mG 1 Tablet(s) Oral every 4 hours PRN Mild Pain (1 - 3)        Vital Signs Last 24 Hrs  T97.6F  P84  /87  R 18  SpO2 97% on RA     GENERAL: comfortable on RA,  NAD  NECK: supple, No JVD  CHEST/LUNG: clear to auscultation bilaterally; no rales, rhonchi, or wheezing b/l  HEART: normal S1, S2  ABDOMEN: BS+, soft, ND, NT   EXTREMITIES:  pulses palpable b/l; no clubbing, cyanosis, calf tenderness or edema b/l LEs  SKIN: no rashes or lesions   MS: pain with palpation of right paraspinal along lower lumbar and midline , symmetric weakness   neuro alert and confused, moving all extremities       LABS:                                              13.1   6.65  )-----------( 229      ( 08 Aug 2021 10:00 )             38.9   08-08    141  |  106  |  12  ----------------------------<  92  3.8   |  28  |  0.92    Ca    8.7      08 Aug 2021 08:59  Phos  3.5       Mg     2.3         TPro  6.3  /  Alb  2.6<L>  /  TBili  1.1  /  DBili  .23<H>  /  AST  29  /  ALT  12  /  AlkPhos  98                    Urinalysis Basic - ( 03 Aug 2021 16:11 )    Color: Yellow / Appearance: Clear / S.015 / pH: x  Gluc: x / Ketone: Trace  / Bili: Negative / Urobili: 4 mg/dL   Blood: x / Protein: 30 mg/dL / Nitrite: Negative   Leuk Esterase: Trace / RBC: 6-10 /HPF / WBC 3-5   Sq Epi: x / Non Sq Epi: Occasional / Bacteria: Few      < from: CT Lumbar Spine No Cont (21 @ 18:26) >  IMPRESSION:    L5-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    MRI may provide helpful additional evaluation, if clinically indicated.      < end of copied text >  < from: CT Maxillofacial No Cont (21 @ 17:08) >  IMPRESSION:    CT HEAD:  No intracranial bleed or depressed skull fracture.      CT FACIAL BONES: No acute fracture or acute dislocation.    Chronic sinus disease/sinusitis.      CT CERVICAL SPINE: No acute fracture or acute subluxation.      < end of copied text >    < from: MR Cervical Spine No Cont (21 @ 15:56) >    EXAM:  MR SPINE CERVICAL                            PROCEDURE DATE:  2021          INTERPRETATION:  MRI cervical spine without contrast  History bilateral lower extremity weakness    There is no compression deformity or subluxation or marrow lesion or edema. The spinal cord is normal in signal and contour. There is no epidural mass or collection.    The C2-3 level is normal    There is moderately severe degenerative loss of disc height and signal at C3-4. Mild broad disc osteophyte complex encroaches on but does not displace or deform the ventral spinal cord. Uncinate hypertrophy contributes to mild right-sided foraminal stenosis    There is mild uncinate and facet hypertrophy on the right at the otherwise normal C4-5 level    Disc height and signal is relatively maintained at C5-6. There is minimal diffuse degenerative dorsal disc bulging mildly deforming the ventral thecal sac without spinal stenosis or cord impingement. Facet hypertrophy contributes to mild right-sided foraminal stenosis    There is severe degenerative disc change and mild annular osteophyte at C6-7. It mildly deforms the ventral thecal sac without spinal stenosis or cord impingement.    There is mild degenerative facet change at the otherwise normal C7-T1 level.    IMPRESSION:  Age typical spondylosis without intrinsic spinal cord abnormality or extrinsic impingement    < end of copied text >    < from: MR Thoracic Spine No Cont (21 @ 15:56) >  EXAM:  MR SPINE THORACIC                            PROCEDURE DATE:  2021          INTERPRETATION:  MRI of thoracic spine without contrast    History bilateral lower extremity weakness    There is slightly exaggerated thoracic kyphosis. Thereis no compression fracture or subluxation or marrow infiltration or edema. The disc spaces are relatively maintained without significant dorsal bulging or ridging or focal protrusion. There is no epidural mass or collection or spinal stenosis. The spinal cord is normal in signal and contour.    IMPRESSION:  Negative for spinal canal compromise    < end of copied text >    < from: MR Lumbar Spine No Cont (21 @ 15:56) >  IMPRESSION:  No acute vertebral column or spinal canal findings. Underlying spondylosis notable for mild spinal stenosis at L5-S1.    Predominantly right-sided dorsal paraspinal muscular edema or infiltration which may represent a strain or other reactive or inflammatory process.    < end of copied text >      Care discussed in detail with patient and daughter at bedside.  All questions and concerns addressed

## 2021-08-09 LAB
CHOLEST SERPL-MCNC: 144 MG/DL — SIGNIFICANT CHANGE UP
HDLC SERPL-MCNC: 41 MG/DL — SIGNIFICANT CHANGE UP
LIPID PNL WITH DIRECT LDL SERPL: 85 MG/DL — SIGNIFICANT CHANGE UP
NON HDL CHOLESTEROL: 103 MG/DL — SIGNIFICANT CHANGE UP
TRIGL SERPL-MCNC: 88 MG/DL — SIGNIFICANT CHANGE UP

## 2021-08-09 PROCEDURE — 99232 SBSQ HOSP IP/OBS MODERATE 35: CPT

## 2021-08-09 RX ORDER — QUETIAPINE FUMARATE 200 MG/1
1 TABLET, FILM COATED ORAL
Qty: 0 | Refills: 0 | DISCHARGE
Start: 2021-08-09

## 2021-08-09 RX ORDER — ACETAMINOPHEN 500 MG
2 TABLET ORAL
Qty: 0 | Refills: 0 | DISCHARGE
Start: 2021-08-09

## 2021-08-09 RX ORDER — BACLOFEN 100 %
5 POWDER (GRAM) MISCELLANEOUS DAILY
Refills: 0 | Status: DISCONTINUED | OUTPATIENT
Start: 2021-08-09 | End: 2021-08-10

## 2021-08-09 RX ORDER — MAGNESIUM HYDROXIDE 400 MG/1
30 TABLET, CHEWABLE ORAL DAILY
Refills: 0 | Status: DISCONTINUED | OUTPATIENT
Start: 2021-08-09 | End: 2021-08-10

## 2021-08-09 RX ORDER — BACLOFEN 100 %
5 POWDER (GRAM) MISCELLANEOUS DAILY
Refills: 0 | Status: DISCONTINUED | OUTPATIENT
Start: 2021-08-09 | End: 2021-08-09

## 2021-08-09 RX ORDER — AMLODIPINE BESYLATE 2.5 MG/1
1 TABLET ORAL
Qty: 0 | Refills: 0 | DISCHARGE
Start: 2021-08-09

## 2021-08-09 RX ORDER — CARBIDOPA AND LEVODOPA 25; 100 MG/1; MG/1
1 TABLET ORAL
Qty: 0 | Refills: 0 | DISCHARGE
Start: 2021-08-09

## 2021-08-09 RX ORDER — POLYETHYLENE GLYCOL 3350 17 G/17G
17 POWDER, FOR SOLUTION ORAL
Qty: 0 | Refills: 0 | DISCHARGE
Start: 2021-08-09

## 2021-08-09 RX ORDER — SODIUM CHLORIDE 9 MG/ML
500 INJECTION INTRAMUSCULAR; INTRAVENOUS; SUBCUTANEOUS ONCE
Refills: 0 | Status: COMPLETED | OUTPATIENT
Start: 2021-08-09 | End: 2021-08-09

## 2021-08-09 RX ORDER — SENNA PLUS 8.6 MG/1
2 TABLET ORAL
Qty: 0 | Refills: 0 | DISCHARGE
Start: 2021-08-09

## 2021-08-09 RX ORDER — LIDOCAINE 4 G/100G
1 CREAM TOPICAL
Qty: 0 | Refills: 0 | DISCHARGE
Start: 2021-08-09

## 2021-08-09 RX ADMIN — LIDOCAINE 1 PATCH: 4 CREAM TOPICAL at 23:04

## 2021-08-09 RX ADMIN — POLYETHYLENE GLYCOL 3350 17 GRAM(S): 17 POWDER, FOR SOLUTION ORAL at 17:50

## 2021-08-09 RX ADMIN — HEPARIN SODIUM 5000 UNIT(S): 5000 INJECTION INTRAVENOUS; SUBCUTANEOUS at 17:50

## 2021-08-09 RX ADMIN — POLYETHYLENE GLYCOL 3350 17 GRAM(S): 17 POWDER, FOR SOLUTION ORAL at 05:32

## 2021-08-09 RX ADMIN — QUETIAPINE FUMARATE 25 MILLIGRAM(S): 200 TABLET, FILM COATED ORAL at 22:32

## 2021-08-09 RX ADMIN — HEPARIN SODIUM 5000 UNIT(S): 5000 INJECTION INTRAVENOUS; SUBCUTANEOUS at 05:31

## 2021-08-09 RX ADMIN — LIDOCAINE 1 PATCH: 4 CREAM TOPICAL at 11:20

## 2021-08-09 RX ADMIN — LIDOCAINE 1 PATCH: 4 CREAM TOPICAL at 19:42

## 2021-08-09 RX ADMIN — Medication 5 MILLIGRAM(S): at 11:20

## 2021-08-09 RX ADMIN — CARBIDOPA AND LEVODOPA 1 TABLET(S): 25; 100 TABLET ORAL at 05:31

## 2021-08-09 RX ADMIN — SENNA PLUS 2 TABLET(S): 8.6 TABLET ORAL at 22:32

## 2021-08-09 RX ADMIN — AMLODIPINE BESYLATE 10 MILLIGRAM(S): 2.5 TABLET ORAL at 05:31

## 2021-08-09 RX ADMIN — SODIUM CHLORIDE 500 MILLILITER(S): 9 INJECTION INTRAMUSCULAR; INTRAVENOUS; SUBCUTANEOUS at 16:07

## 2021-08-09 RX ADMIN — CARBIDOPA AND LEVODOPA 1 TABLET(S): 25; 100 TABLET ORAL at 22:32

## 2021-08-09 RX ADMIN — CARBIDOPA AND LEVODOPA 1 TABLET(S): 25; 100 TABLET ORAL at 14:18

## 2021-08-09 NOTE — DISCHARGE NOTE PROVIDER - CARE PROVIDER_API CALL
your primary care doctor,   Phone: (   )    -  Fax: (   )    -  Follow Up Time: 1 week    Sukh Gottlieb (DO)  Orthopaedic Surgery Surgery  [address] [facility], [place] [address]  Phone: [phone]  Fax: [phone]  Follow Up Time: 2 weeks

## 2021-08-09 NOTE — DISCHARGE NOTE PROVIDER - NSDCMRMEDTOKEN_GEN_ALL_CORE_FT
acetaminophen 325 mg oral tablet: 2 tab(s) orally every 6 hours, As needed, Mild Pain (1 - 3)  amLODIPine 10 mg oral tablet: 1 tab(s) orally once a day  carbidopa-levodopa 25 mg-100 mg oral tablet: 1 tab(s) orally 3 times a day  lidocaine 4% topical film: Apply topically to affected area once a day  oxycodone-acetaminophen 5 mg-325 mg oral tablet: 1 tab(s) orally every 4 hours, As needed, severe pain 7-10  polyethylene glycol 3350 oral powder for reconstitution: 17 gram(s) orally 2 times a day  QUEtiapine 25 mg oral tablet: 1 tab(s) orally once a day (at bedtime)  senna oral tablet: 2 tab(s) orally once a day (at bedtime)   acetaminophen 325 mg oral tablet: 2 tab(s) orally every 6 hours, As needed, Mild Pain (1 - 3)  carbidopa-levodopa 25 mg-100 mg oral tablet: 1 tab(s) orally 3 times a day  lidocaine 4% topical film: Apply topically to affected area once a day  magnesium hydroxide 8% oral suspension: 30 milliliter(s) orally once a day, As needed, Constipation  oxycodone-acetaminophen 5 mg-325 mg oral tablet: 1 tab(s) orally every 4 hours, As needed, severe pain 7-10  polyethylene glycol 3350 oral powder for reconstitution: 17 gram(s) orally 2 times a day  QUEtiapine 25 mg oral tablet: 1 tab(s) orally once a day (at bedtime)  senna oral tablet: 2 tab(s) orally once a day (at bedtime)

## 2021-08-09 NOTE — DISCHARGE NOTE PROVIDER - NSDCCPCAREPLAN_GEN_ALL_CORE_FT
PRINCIPAL DISCHARGE DIAGNOSIS  Diagnosis: Gait instability  Assessment and Plan of Treatment:       SECONDARY DISCHARGE DIAGNOSES  Diagnosis: Recurrent falls  Assessment and Plan of Treatment:     Diagnosis: Lower back pain  Assessment and Plan of Treatment:     Diagnosis: Parkinson disease  Assessment and Plan of Treatment:     Diagnosis: Hypertension  Assessment and Plan of Treatment:

## 2021-08-09 NOTE — PROGRESS NOTE ADULT - TIME BILLING
min spent reviewing chart, examining patient, discussing plan with patient and family

## 2021-08-09 NOTE — DISCHARGE NOTE PROVIDER - HOSPITAL COURSE
69 yo male PMH of Parkinson's syndrome, HLD, and takes aspirin daily presents to the ED for fall at home.    CXR: clear   UA neg   RVP neg   EKG: NSR, QTc 439     DISCHARGE DIAGNOSES:     s/p Fall    Pt with low back pain and lower extremity weakness   no e/o infection, afebrile, no leukocytosis    CT LS spine and neck no fractures. CT head no acute injuries. Chronic sinus disease/sinusitis.  Ortho following ,  MRI C/T/L spine reviewed by ortho, MRI of the C/T/L spine demonstrates degenerative changes. No significant findings that would require urgent surgical intervention at this time, including the patients physical exam findings.  pain control prn , lidocaine patch   added baclofen while patient is here, will stop  on discharge to minimize risk of fall/drowsiness     Parkinson's Dementia -- with agitated/ aggressive  behavior   Will continue Sinemet tid,   seroquel qhs   EKG reviewed , no QTc prolongation   Psych consulted appreciated     HTN/HLD   continue with  Norvasc 10 mg/day for BP control , monitor bp  LFTs OK, will hold off on restarting statin at this time as can cause muscle weakness, recommend to follow-up outpatient with PMD to re-evaluate need for statin     Discharge time : 40 min       RETURN PARAMETERS DISCUSSED WITH PATIENT and, daughter;  They  EXPRESSED UNDERSTANDING AND IS AGREEABLE.

## 2021-08-09 NOTE — PROGRESS NOTE ADULT - REASON FOR ADMISSION
Fall r/o spinal fracture

## 2021-08-09 NOTE — DISCHARGE NOTE PROVIDER - NSDCFUADDINST_GEN_ALL_CORE_FT
It is important to see your primary physician as well as other necessary consultants within the next week to perform a comprehensive medical review.  Call their offices for an appointment as soon as you leave the hospital.  If you do not have a primary physician or cant reach him/her, contact the [facility] Physician Referral Service at [phone].  Your medical issues appear to be stable at this time, but if your symptoms recur or worsen, contact your physicians and/or return to the hospital if necessary.  If you encounter any issues or questions with your medication, call your physicians before stopping the medication.

## 2021-08-09 NOTE — PROGRESS NOTE ADULT - ASSESSMENT
69 yo male PMH of Parkinson's syndrome, HLD, and takes aspirin daily presents to the ED for fall at home.    CXR: clear   UA neg   RVP neg   EKG: NSR, QTc 439     Assessment and Plan:   s/p Fall    Pt with low back pain and lower extremity weakness   no e/o infection, afebrile, no leukocytosis    CT LS spine and neck no fractures. CT head no acute injuries. Chronic sinus disease/sinusitis.  Ortho following ,  MRI C/T/L spine reviewed by ortho, MRI of the C/T/L spine demonstrates degenerative changes. No significant findings that would require urgent surgical intervention at this time, including the patients physical exam findings.  pain control prn , lidocaine patch   continue with baclofen while patient is here, stop  on discharge to minimize risk of fall     Parkinson's Dementia -- with agitated/ aggressive  behavior   Will continue Sinemet tid,   seroquel qhs   ativan prn per psych recs   EKG reviewed , no QTc prolongation   Psych consulted appreciated     HTN/HLD   continue with  Norvasc 10 mg/day for BP control , monitor bp  HLD with elevated lfts hold statin, repeat LFTs      Preventative measures   heparin SQ-dvt ppx  fall precautions   aspiration precautions     PT: ALEKSANDRA , awaiting auth        69 yo male PMH of Parkinson's syndrome, HLD, and takes aspirin daily presents to the ED for fall at home.    CXR: clear   UA neg   RVP neg   EKG: NSR, QTc 439     Assessment and Plan:   s/p Fall    Pt with low back pain and lower extremity weakness   no e/o infection, afebrile, no leukocytosis    CT LS spine and neck no fractures. CT head no acute injuries. Chronic sinus disease/sinusitis.  Ortho following ,  MRI C/T/L spine reviewed by ortho, MRI of the C/T/L spine demonstrates degenerative changes. No significant findings that would require urgent surgical intervention at this time, including the patients physical exam findings.  pain control prn , lidocaine patch   continue with baclofen while patient is here, stop  on discharge to minimize risk of fall     Parkinson's Dementia -- with agitated/ aggressive  behavior   Will continue Sinemet tid,   seroquel qhs   ativan prn per psych recs   EKG reviewed , no QTc prolongation   Psych consulted appreciated     HTN/HLD   will d/c amlodipine for now as BP on low side, monitor   HLD with elevated lfts hold statin, repeat LFTs      Preventative measures   heparin SQ-dvt ppx  fall precautions   aspiration precautions     PT: ALEKSANDRA , awaiting auth

## 2021-08-09 NOTE — PROGRESS NOTE ADULT - SUBJECTIVE AND OBJECTIVE BOX
69 yo male PMH of Parkinson's syndrome, HLD, and takes aspirin daily presents to the ED for fall at home. Pt states he was reading at home, tried to get up, he stepped off and fell. No head trauma. No headache, neck pain, or chest pain. Pt has left eye swelling. Pt states has muscle back pain and it feels like a stick, jabbing pain. Pt is fully vaccinated for COVID.       INTERVAL HPI/OVERNIGHT EVENTS:  Pt seen and examined at bedside.   no overnight events,.   no complaints.     Denies fever, chills, N/V, dizziness, HA, cough, CP, palpitations, SOB, abdominal pain, dysuria, diarrhea, constipation.     Allergies/Intolerance: No Known Allergies      MEDICATIONS  (STANDING):  amLODIPine   Tablet 10 milliGRAM(s) Oral daily  carbidopa/levodopa  25/100 1 Tablet(s) Oral three times a day  heparin   Injectable 5000 Unit(s) SubCutaneous every 12 hours  lidocaine   4% Patch 1 Patch Transdermal daily  polyethylene glycol 3350 17 Gram(s) Oral two times a day  senna 2 Tablet(s) Oral at bedtime  simvastatin 10 milliGRAM(s) Oral at bedtime    MEDICATIONS  (PRN):  acetaminophen   Tablet .. 650 milliGRAM(s) Oral every 6 hours PRN Temp greater or equal to 38.5C (101.3F), Mild Pain (1 - 3)  oxycodone    5 mG/acetaminophen 325 mG 1 Tablet(s) Oral every 4 hours PRN Mild Pain (1 - 3)        Vital Signs Last 24 Hrs  Vital Signs Last 24 Hrs  T(C): 36.7 (09 Aug 2021 11:21), Max: 36.7 (08 Aug 2021 16:39)  T(F): 98 (09 Aug 2021 11:21), Max: 98 (08 Aug 2021 16:39)  HR: 77 (09 Aug 2021 11:21) (71 - 84)  BP: 98/64 (09 Aug 2021 11:21) (98/64 - 145/88)  BP(mean): --  RR: 18 (09 Aug 2021 11:21) (18 - 18)  SpO2: 97% (09 Aug 2021 11:21) (97% - 98%)    GENERAL: comfortable on RA,  NAD  NECK: supple, No JVD  CHEST/LUNG: clear to auscultation bilaterally; no rales, rhonchi, or wheezing b/l  HEART: normal S1, S2  ABDOMEN: BS+, soft, ND, NT   EXTREMITIES:  pulses palpable b/l; no clubbing, cyanosis, calf tenderness or edema b/l LEs  SKIN: no rashes or lesions   MS: pain with palpation of right paraspinal along lower lumbar and midline , symmetric weakness   neuro alert and confused, moving all extremities       LABS:                                              13.1   6.65  )-----------( 229      ( 08 Aug 2021 10:00 )             38.9       141  |  106  |  12  ----------------------------<  92  3.8   |  28  |  0.92    Ca    8.7      08 Aug 2021 08:59  Phos  3.5       Mg     2.3         TPro  6.3  /  Alb  2.6<L>  /  TBili  1.1  /  DBili  .23<H>  /  AST  29  /  ALT  12  /  AlkPhos  98                  Urinalysis Basic - ( 03 Aug 2021 16:11 )    Color: Yellow / Appearance: Clear / S.015 / pH: x  Gluc: x / Ketone: Trace  / Bili: Negative / Urobili: 4 mg/dL   Blood: x / Protein: 30 mg/dL / Nitrite: Negative   Leuk Esterase: Trace / RBC: 6-10 /HPF / WBC 3-5   Sq Epi: x / Non Sq Epi: Occasional / Bacteria: Few      < from: CT Lumbar Spine No Cont (21 @ 18:26) >  IMPRESSION:    L5-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    MRI may provide helpful additional evaluation, if clinically indicated.      < end of copied text >  < from: CT Maxillofacial No Cont (21 @ 17:08) >  IMPRESSION:    CT HEAD:  No intracranial bleed or depressed skull fracture.      CT FACIAL BONES: No acute fracture or acute dislocation.    Chronic sinus disease/sinusitis.      CT CERVICAL SPINE: No acute fracture or acute subluxation.      < end of copied text >    < from: MR Cervical Spine No Cont (21 @ 15:56) >    EXAM:  MR SPINE CERVICAL                            PROCEDURE DATE:  2021          INTERPRETATION:  MRI cervical spine without contrast  History bilateral lower extremity weakness    There is no compression deformity or subluxation or marrow lesion or edema. The spinal cord is normal in signal and contour. There is no epidural mass or collection.    The C2-3 level is normal    There is moderately severe degenerative loss of disc height and signal at C3-4. Mild broad disc osteophyte complex encroaches on but does not displace or deform the ventral spinal cord. Uncinate hypertrophy contributes to mild right-sided foraminal stenosis    There is mild uncinate and facet hypertrophy on the right at the otherwise normal C4-5 level    Disc height and signal is relatively maintained at C5-6. There is minimal diffuse degenerative dorsal disc bulging mildly deforming the ventral thecal sac without spinal stenosis or cord impingement. Facet hypertrophy contributes to mild right-sided foraminal stenosis    There is severe degenerative disc change and mild annular osteophyte at C6-7. It mildly deforms the ventral thecal sac without spinal stenosis or cord impingement.    There is mild degenerative facet change at the otherwise normal C7-T1 level.    IMPRESSION:  Age typical spondylosis without intrinsic spinal cord abnormality or extrinsic impingement    < end of copied text >    < from: MR Thoracic Spine No Cont (21 @ 15:56) >  EXAM:  MR SPINE THORACIC                            PROCEDURE DATE:  2021          INTERPRETATION:  MRI of thoracic spine without contrast    History bilateral lower extremity weakness    There is slightly exaggerated thoracic kyphosis. Thereis no compression fracture or subluxation or marrow infiltration or edema. The disc spaces are relatively maintained without significant dorsal bulging or ridging or focal protrusion. There is no epidural mass or collection or spinal stenosis. The spinal cord is normal in signal and contour.    IMPRESSION:  Negative for spinal canal compromise    < end of copied text >    < from: MR Lumbar Spine No Cont (21 @ 15:56) >  IMPRESSION:  No acute vertebral column or spinal canal findings. Underlying spondylosis notable for mild spinal stenosis at L5-S1.    Predominantly right-sided dorsal paraspinal muscular edema or infiltration which may represent a strain or other reactive or inflammatory process.    < end of copied text >      Care discussed in detail with patient and daughter at bedside.  All questions and concerns addressed

## 2021-08-09 NOTE — DISCHARGE NOTE PROVIDER - PROVIDER TOKENS
FREE:[LAST:[your primary care doctor],PHONE:[(   )    -],FAX:[(   )    -],FOLLOWUP:[1 week]],PROVIDER:[TOKEN:[[id number]],FOLLOWUP:[2 weeks]]

## 2021-08-10 VITALS
SYSTOLIC BLOOD PRESSURE: 112 MMHG | TEMPERATURE: 98 F | RESPIRATION RATE: 18 BRPM | OXYGEN SATURATION: 99 % | HEART RATE: 74 BPM | DIASTOLIC BLOOD PRESSURE: 73 MMHG

## 2021-08-10 PROCEDURE — 99239 HOSP IP/OBS DSCHRG MGMT >30: CPT

## 2021-08-10 RX ORDER — MAGNESIUM HYDROXIDE 400 MG/1
30 TABLET, CHEWABLE ORAL
Qty: 0 | Refills: 0 | DISCHARGE
Start: 2021-08-10

## 2021-08-10 RX ORDER — LIDOCAINE 4 G/100G
1 CREAM TOPICAL DAILY
Refills: 0 | Status: DISCONTINUED | OUTPATIENT
Start: 2021-08-11 | End: 2021-08-10

## 2021-08-10 RX ADMIN — POLYETHYLENE GLYCOL 3350 17 GRAM(S): 17 POWDER, FOR SOLUTION ORAL at 05:40

## 2021-08-10 RX ADMIN — CARBIDOPA AND LEVODOPA 1 TABLET(S): 25; 100 TABLET ORAL at 13:57

## 2021-08-10 RX ADMIN — HEPARIN SODIUM 5000 UNIT(S): 5000 INJECTION INTRAVENOUS; SUBCUTANEOUS at 05:41

## 2021-08-10 RX ADMIN — LIDOCAINE 1 PATCH: 4 CREAM TOPICAL at 11:40

## 2021-08-10 RX ADMIN — Medication 5 MILLIGRAM(S): at 11:40

## 2021-08-10 RX ADMIN — CARBIDOPA AND LEVODOPA 1 TABLET(S): 25; 100 TABLET ORAL at 05:40

## 2021-08-10 NOTE — CHART NOTE - NSCHARTNOTEFT_GEN_A_CORE
Patient seen and examined bedside.   no overnight events  large BM this AM   no complaints.     Vital Signs Last 24 Hrs  T(C): 36.3 (10 Aug 2021 12:22), Max: 36.8 (09 Aug 2021 17:25)  T(F): 97.3 (10 Aug 2021 12:22), Max: 98.2 (09 Aug 2021 17:25)  HR: 100 (10 Aug 2021 12:22) (73 - 103)  BP: 108/68 (10 Aug 2021 12:22) (86/51 - 124/76)  BP(mean): --  RR: 18 (10 Aug 2021 12:22) (18 - 18)  SpO2: 95% (10 Aug 2021 12:22) (94% - 98%) on room air     GENERAL: comfortable on RA,  NAD  NECK: supple, No JVD  CHEST/LUNG: clear to auscultation bilaterally; no rales, rhonchi, or wheezing b/l  HEART: normal S1, S2  ABDOMEN: BS+, soft, ND, NT   EXTREMITIES:  pulses palpable b/l; no clubbing, cyanosis, calf tenderness or edema b/l LEs  SKIN: no rashes or lesions   MS: pain with palpation of right paraspinal along lower lumbar and midline , symmetric weakness   neuro alert and confused, moving all extremities     patient for d/c today --- to rehab   see d/c note

## 2021-08-10 NOTE — DISCHARGE NOTE NURSING/CASE MANAGEMENT/SOCIAL WORK - PATIENT PORTAL LINK FT
You can access the FollowMyHealth Patient Portal offered by [facility] by registering at the following website: [email or web address]. By joining [employer]’s FollowMyHealth portal, you will also be able to view your health information using other applications (apps) compatible with our system.

## 2021-08-16 DIAGNOSIS — G31.83 NEUROCOGNITIVE DISORDER WITH LEWY BODIES: ICD-10-CM

## 2021-08-16 DIAGNOSIS — M54.5 LOW BACK PAIN: ICD-10-CM

## 2021-08-16 DIAGNOSIS — F02.81 DEMENTIA IN OTHER DISEASES CLASSIFIED ELSEWHERE, UNSPECIFIED SEVERITY, WITH BEHAVIORAL DISTURBANCE: ICD-10-CM

## 2021-08-16 DIAGNOSIS — E78.5 HYPERLIPIDEMIA, UNSPECIFIED: ICD-10-CM

## 2021-08-16 DIAGNOSIS — R29.898 OTHER SYMPTOMS AND SIGNS INVOLVING THE MUSCULOSKELETAL SYSTEM: ICD-10-CM

## 2021-08-16 DIAGNOSIS — R26.89 OTHER ABNORMALITIES OF GAIT AND MOBILITY: ICD-10-CM

## 2021-08-16 DIAGNOSIS — I10 ESSENTIAL (PRIMARY) HYPERTENSION: ICD-10-CM

## 2023-01-13 NOTE — PATIENT PROFILE ADULT - NSPROHMSYMPCOND_GEN_A_NUR
Problem: Prexisting or High Potential for Compromised Skin Integrity  Goal: Skin integrity is maintained or improved  Description: INTERVENTIONS:  - Identify patients at risk for skin breakdown  - Assess and monitor skin integrity  - Assess and monitor nutrition and hydration status  - Monitor labs   - Assess for incontinence   - Turn and reposition patient  - Assist with mobility/ambulation  - Relieve pressure over bony prominences  - Avoid friction and shearing  - Provide appropriate hygiene as needed including keeping skin clean and dry  - Evaluate need for skin moisturizer/barrier cream  - Collaborate with interdisciplinary team   - Patient/family teaching  - Consider wound care consult   Outcome: Progressing     Problem: MOBILITY - ADULT  Goal: Maintain or return to baseline ADL function  Description: INTERVENTIONS:  -  Assess patient's ability to carry out ADLs; assess patient's baseline for ADL function and identify physical deficits which impact ability to perform ADLs (bathing, care of mouth/teeth, toileting, grooming, dressing, etc )  - Assess/evaluate cause of self-care deficits   - Assess range of motion  - Assess patient's mobility; develop plan if impaired  - Assess patient's need for assistive devices and provide as appropriate  - Encourage maximum independence but intervene and supervise when necessary  - Involve family in performance of ADLs  - Assess for home care needs following discharge   - Consider OT consult to assist with ADL evaluation and planning for discharge  - Provide patient education as appropriate  Outcome: Progressing  Goal: Maintains/Returns to pre admission functional level  Description: INTERVENTIONS:  - Perform BMAT or MOVE assessment daily    - Set and communicate daily mobility goal to care team and patient/family/caregiver  - Collaborate with rehabilitation services on mobility goals if consulted  - Perform Range of Motion  times a day    - Reposition patient every hours   - Dangle patient  times a day  - Stand patient  times a day  - Ambulate patient  times a day  - Out of bed to chair  times a day   - Out of bed for meals  times a day  - Out of bed for toileting  - Record patient progress and toleration of activity level   Outcome: Progressing     Problem: Potential for Falls  Goal: Patient will remain free of falls  Description: INTERVENTIONS:  - Educate patient/family on patient safety including physical limitations  - Instruct patient to call for assistance with activity   - Consult OT/PT to assist with strengthening/mobility   - Keep Call bell within reach  - Keep bed low and locked with side rails adjusted as appropriate  - Keep care items and personal belongings within reach  - Initiate and maintain comfort rounds  - Make Fall Risk Sign visible to staff  - Offer Toileting every  Hours, in advance of need  - Initiate/Maintain alarm  - Obtain necessary fall risk management eq  - Apply yellow socks and bracelet for high fall risk patients  - Consider moving patient to room near nurses station  Outcome: Progressing musculoskeletal
